# Patient Record
Sex: MALE | Race: WHITE | Employment: OTHER | URBAN - METROPOLITAN AREA
[De-identification: names, ages, dates, MRNs, and addresses within clinical notes are randomized per-mention and may not be internally consistent; named-entity substitution may affect disease eponyms.]

---

## 2018-07-10 ENCOUNTER — TRANSCRIBE ORDERS (OUTPATIENT)
Dept: ADMINISTRATIVE | Facility: HOSPITAL | Age: 73
End: 2018-07-10

## 2018-07-10 DIAGNOSIS — H91.90 HEARING PROBLEM, UNSPECIFIED LATERALITY: Primary | ICD-10-CM

## 2018-07-10 DIAGNOSIS — R26.89 BALANCE PROBLEM: ICD-10-CM

## 2018-07-10 DIAGNOSIS — R25.1 TREMOR: ICD-10-CM

## 2018-07-16 ENCOUNTER — HOSPITAL ENCOUNTER (OUTPATIENT)
Dept: MRI IMAGING | Facility: HOSPITAL | Age: 73
Discharge: HOME | End: 2018-07-16
Attending: PSYCHIATRY & NEUROLOGY
Payer: COMMERCIAL

## 2018-07-16 DIAGNOSIS — H91.90 HEARING PROBLEM, UNSPECIFIED LATERALITY: ICD-10-CM

## 2018-07-16 DIAGNOSIS — R26.89 BALANCE PROBLEM: ICD-10-CM

## 2018-07-16 DIAGNOSIS — R25.1 TREMOR: ICD-10-CM

## 2018-07-16 PROCEDURE — A9585 GADOBUTROL INJECTION: HCPCS | Performed by: PSYCHIATRY & NEUROLOGY

## 2018-07-16 PROCEDURE — 70553 MRI BRAIN STEM W/O & W/DYE: CPT

## 2018-07-16 RX ADMIN — GADOBUTROL 7.5 ML: 604.72 INJECTION INTRAVENOUS at 17:57

## 2018-11-30 ENCOUNTER — OFFICE VISIT (OUTPATIENT)
Dept: CARDIOLOGY CLINIC | Facility: CLINIC | Age: 73
End: 2018-11-30
Payer: COMMERCIAL

## 2018-11-30 VITALS
HEART RATE: 52 BPM | SYSTOLIC BLOOD PRESSURE: 130 MMHG | DIASTOLIC BLOOD PRESSURE: 80 MMHG | WEIGHT: 171 LBS | HEIGHT: 65 IN | BODY MASS INDEX: 28.49 KG/M2

## 2018-11-30 DIAGNOSIS — I10 BENIGN ESSENTIAL HTN: ICD-10-CM

## 2018-11-30 DIAGNOSIS — E78.2 MIXED HYPERLIPIDEMIA: ICD-10-CM

## 2018-11-30 DIAGNOSIS — I25.10 CORONARY ARTERY DISEASE INVOLVING NATIVE CORONARY ARTERY OF NATIVE HEART WITHOUT ANGINA PECTORIS: Primary | ICD-10-CM

## 2018-11-30 PROCEDURE — 99214 OFFICE O/P EST MOD 30 MIN: CPT | Performed by: INTERNAL MEDICINE

## 2018-11-30 PROCEDURE — 93000 ELECTROCARDIOGRAM COMPLETE: CPT | Performed by: INTERNAL MEDICINE

## 2018-11-30 RX ORDER — BENAZEPRIL HYDROCHLORIDE 20 MG/1
20 TABLET ORAL DAILY
COMMUNITY
Start: 2018-10-11

## 2018-11-30 RX ORDER — ASPIRIN 81 MG/1
TABLET ORAL DAILY
COMMUNITY

## 2018-11-30 RX ORDER — AMLODIPINE BESYLATE 5 MG/1
5 TABLET ORAL
COMMUNITY
Start: 2018-11-25

## 2018-11-30 RX ORDER — TAMSULOSIN HYDROCHLORIDE 0.4 MG/1
0.4 CAPSULE ORAL DAILY
COMMUNITY

## 2018-11-30 RX ORDER — ROSUVASTATIN CALCIUM 10 MG/1
10 TABLET, COATED ORAL DAILY
COMMUNITY
End: 2020-01-07 | Stop reason: SDUPTHER

## 2018-11-30 RX ORDER — ESCITALOPRAM OXALATE 20 MG/1
20 TABLET ORAL DAILY
COMMUNITY

## 2018-11-30 NOTE — PROGRESS NOTES
Patient ID: Marty Chilel is a 72 y o  male  Plan:      Coronary artery disease involving native coronary artery of native heart without angina pectoris  012 with stenting of the mid circumflex and distal circumflex  Borderline stenosis in the right coronary and LAD were left untreated  Lexiscan study in September of 2017 was normal     No current symptoms  Mixed hyperlipidemia  Tolerating statin therapy  Benign essential HTN  Adequately controlled  Follow up Plan:  1 year EKG and follow-up visit  HPI:  The patient is seen in follow-up today regarding CAD, hypertension, and hyperlipidemia  He is now 6 years post stenting of his circumflex system  There are borderline lesions in the right coronary and LAD  No recent chest pain or chest pressure  A stress test last September was normal       Results for orders placed or performed in visit on 11/30/18   POCT ECG    Impression    NSR  WNL  Past Surgical History:   Procedure Laterality Date    CORONARY ANGIOPLASTY WITH STENT PLACEMENT  09/13/2012    EF 65%, stent x2 to 95% mid CX and 80% distal CX   75% RCA and 60% LAD  CMP: No results found for: NA, K, CL, CO2, BUN, CREATININE, GLUCOSE, EGFR    Lipid Profile:   Lab Results   Component Value Date    CHOL 158 09/17/2014    TRIG 157 09/17/2014    HDL 33 09/17/2014         Review of Systems   10  point ROS  was otherwise non pertinent or negative except as per HPI or as below  Gait:  Normal         Objective:     /80   Pulse (!) 52   Ht 5' 5" (1 651 m)   Wt 77 6 kg (171 lb)   BMI 28 46 kg/m²     PHYSICAL EXAM:    General:  Normal appearance in no distress  Eyes:  Anicteric  Oral mucosa:  Moist   Neck:  No JVD  Carotid upstrokes are brisk without bruits  No masses  Chest:  Clear to auscultation and percussion  Cardiac:  Normal PMI  Normal S1 and S2  No murmur gallop or rub  Abdomen:  Soft and nontender   No palpable organomegaly or aortic enlargement  Extremities:  No peripheral edema  Musculoskeletal:  Symmetric  Vascular:  Femoral pulses are brisk without bruits  Popliteal pulses are intact bilaterally  Pedal pulses are intact  Neuro:  Grossly symmetric  Psych:  Alert and oriented x3  Current Outpatient Prescriptions:     amLODIPine (NORVASC) 5 mg tablet, Take 5 mg by mouth 2 (two) times a day, Disp: , Rfl:     aspirin (ECOTRIN LOW STRENGTH) 81 mg EC tablet, Daily, Disp: , Rfl:     benazepril (LOTENSIN) 20 mg tablet, Take 20 mg by mouth daily, Disp: , Rfl:     escitalopram (LEXAPRO) 20 mg tablet, Take 20 mg by mouth Daily, Disp: , Rfl:     rosuvastatin (CRESTOR) 10 MG tablet, Take 10 mg by mouth Daily  , Disp: , Rfl:     tamsulosin (FLOMAX) 0 4 mg, Take 0 4 mg by mouth daily  , Disp: , Rfl:   Allergies   Allergen Reactions    Sulfa Antibiotics      Past Medical History:   Diagnosis Date    Coronary artery disease     s/p stenting    History of nuclear stress test 10/15/2013    Lexiscan - EF 49%, no evidence for prior MI or ischemia   History of nuclear stress test 09/19/2017    Lexiscan - EF 51%, no evidence for prior MI or ischemia      Hyperlipidemia     Hypertension            History   Smoking Status    Never Smoker   Smokeless Tobacco    Never Used

## 2018-11-30 NOTE — ASSESSMENT & PLAN NOTE
012 with stenting of the mid circumflex and distal circumflex  Borderline stenosis in the right coronary and LAD were left untreated  Lexiscan study in September of 2017 was normal     No current symptoms

## 2020-01-07 ENCOUNTER — OFFICE VISIT (OUTPATIENT)
Dept: CARDIOLOGY CLINIC | Facility: CLINIC | Age: 75
End: 2020-01-07
Payer: COMMERCIAL

## 2020-01-07 VITALS
BODY MASS INDEX: 29.82 KG/M2 | HEIGHT: 65 IN | SYSTOLIC BLOOD PRESSURE: 124 MMHG | WEIGHT: 179 LBS | DIASTOLIC BLOOD PRESSURE: 78 MMHG | HEART RATE: 60 BPM

## 2020-01-07 DIAGNOSIS — E78.2 MIXED HYPERLIPIDEMIA: ICD-10-CM

## 2020-01-07 DIAGNOSIS — I25.10 CORONARY ARTERY DISEASE INVOLVING NATIVE CORONARY ARTERY OF NATIVE HEART WITHOUT ANGINA PECTORIS: Primary | ICD-10-CM

## 2020-01-07 DIAGNOSIS — I10 BENIGN ESSENTIAL HTN: ICD-10-CM

## 2020-01-07 PROCEDURE — 93000 ELECTROCARDIOGRAM COMPLETE: CPT | Performed by: INTERNAL MEDICINE

## 2020-01-07 PROCEDURE — 3074F SYST BP LT 130 MM HG: CPT | Performed by: INTERNAL MEDICINE

## 2020-01-07 PROCEDURE — 3078F DIAST BP <80 MM HG: CPT | Performed by: INTERNAL MEDICINE

## 2020-01-07 PROCEDURE — 99214 OFFICE O/P EST MOD 30 MIN: CPT | Performed by: INTERNAL MEDICINE

## 2020-01-07 RX ORDER — ROSUVASTATIN CALCIUM 20 MG/1
20 TABLET, COATED ORAL DAILY
Qty: 90 TABLET | Refills: 5 | Status: SHIPPED | OUTPATIENT
Start: 2020-01-07

## 2020-01-07 NOTE — PROGRESS NOTES
Patient ID: Elana Garcia is a 69 y o  male  Plan:      Mixed hyperlipidemia    Will increase the statin intensity by doubling the rosuvastatin dose  Coronary artery disease involving native coronary artery of native heart without angina pectoris   No current symptoms  Now is 7 years post stenting of the circumflex system  Benign essential HTN    Well controlled  Follow up Plan:  One year EKG and follow-up visit  At that point likely order a Pankaj Hanley study  HPI:   The patient is seen in follow-up today regarding CAD  To reiterate, in September of 2012 he underwent coronary angiography  Ejection fraction was normal   Severe stenosis of the mid and distal circumflex artery received stents  There was more borderline stenosis of the right coronary artery and LAD but follow-up stress testing was nonischemic  He has had no chest pain or chest pressure since then  Results for orders placed or performed in visit on 01/07/20   POCT ECG    Impression    NSR  WNL  Most recent or relevant cardiac/vascular testing:      [place] 09/19/2017: Normal ejection fraction  Normal       Past Surgical History:   Procedure Laterality Date    CORONARY ANGIOPLASTY WITH STENT PLACEMENT  09/13/2012    EF 65%, stent x2 to 95% mid CX and 80% distal CX   75% RCA and 60% LAD  CMP: No results found for: NA, K, CL, CO2, BUN, CREATININE, GLUCOSE, EGFR    Lipid Profile:   Lab Results   Component Value Date    CHOL 158 09/17/2014    TRIG 157 09/17/2014    HDL 33 09/17/2014         Review of Systems   10  point ROS  was otherwise non pertinent or negative except as per HPI or as below  Gait:   Slow but normal       Objective:     /78   Pulse 60   Ht 5' 5" (1 651 m)   Wt 81 2 kg (179 lb)   BMI 29 79 kg/m²     PHYSICAL EXAM:    General:  Normal appearance in no distress  Eyes:  Anicteric  Oral mucosa:  Moist   Neck:  No JVD  Carotid upstrokes are brisk without bruits    No masses  Chest:  Clear to auscultation and percussion  Cardiac:  Normal PMI  Normal S1 and S2  No murmur gallop or rub  Abdomen:  Soft and nontender  No palpable organomegaly or aortic enlargement  Extremities:  No peripheral edema  Musculoskeletal:  Symmetric  Vascular:  Femoral pulses are brisk without bruits  Popliteal pulses are intact bilaterally  Pedal pulses are intact  Neuro:  Grossly symmetric  Psych:  Alert and oriented x3  Current Outpatient Medications:     amLODIPine (NORVASC) 5 mg tablet, Take 5 mg by mouth 2 (two) times a day, Disp: , Rfl:     aspirin (ECOTRIN LOW STRENGTH) 81 mg EC tablet, Daily, Disp: , Rfl:     benazepril (LOTENSIN) 20 mg tablet, Take 20 mg by mouth daily, Disp: , Rfl:     escitalopram (LEXAPRO) 20 mg tablet, Take 20 mg by mouth Daily, Disp: , Rfl:     rosuvastatin (CRESTOR) 20 MG tablet, Take 1 tablet (20 mg total) by mouth daily, Disp: 90 tablet, Rfl: 5    tamsulosin (FLOMAX) 0 4 mg, Take 0 4 mg by mouth daily  , Disp: , Rfl:   Allergies   Allergen Reactions    Sulfa Antibiotics      Past Medical History:   Diagnosis Date    Coronary artery disease     s/p stenting    History of nuclear stress test 10/15/2013    Lexiscan - EF 49%, no evidence for prior MI or ischemia   History of nuclear stress test 09/19/2017    Lexiscan - EF 51%, no evidence for prior MI or ischemia      Hyperlipidemia     Hypertension            Social History     Tobacco Use   Smoking Status Never Smoker   Smokeless Tobacco Never Used

## 2021-03-10 ENCOUNTER — OFFICE VISIT (OUTPATIENT)
Dept: CARDIOLOGY CLINIC | Facility: CLINIC | Age: 76
End: 2021-03-10
Payer: COMMERCIAL

## 2021-03-10 VITALS
HEIGHT: 66 IN | DIASTOLIC BLOOD PRESSURE: 70 MMHG | HEART RATE: 61 BPM | BODY MASS INDEX: 27.21 KG/M2 | WEIGHT: 169.31 LBS | SYSTOLIC BLOOD PRESSURE: 130 MMHG

## 2021-03-10 DIAGNOSIS — E78.2 MIXED HYPERLIPIDEMIA: ICD-10-CM

## 2021-03-10 DIAGNOSIS — I10 BENIGN ESSENTIAL HTN: ICD-10-CM

## 2021-03-10 DIAGNOSIS — Z23 ENCOUNTER FOR IMMUNIZATION: ICD-10-CM

## 2021-03-10 DIAGNOSIS — I25.10 CORONARY ARTERY DISEASE INVOLVING NATIVE CORONARY ARTERY OF NATIVE HEART WITHOUT ANGINA PECTORIS: Primary | ICD-10-CM

## 2021-03-10 PROCEDURE — 93000 ELECTROCARDIOGRAM COMPLETE: CPT | Performed by: INTERNAL MEDICINE

## 2021-03-10 PROCEDURE — 99214 OFFICE O/P EST MOD 30 MIN: CPT | Performed by: INTERNAL MEDICINE

## 2021-03-10 NOTE — PROGRESS NOTES
Patient ID: Matt Castro is a 70 y o  male  Plan:      Coronary artery disease involving native coronary artery of native heart without angina pectoris  Stenting of the circumflex in 2012  No recent symptoms  Mixed hyperlipidemia  Tolerating statin therapy with excellent values in Care everywhere  Benign essential HTN  Well controlled  Follow up Plan/Other summary comments:  One year EKG and follow-up visit  HPI:  Patient is seen in follow-up today regarding the above  He has had no chest pain or chest pressure since his last visit  No recent change in exertional capacity  To reiterate, in September of 2012 he underwent coronary angiography  Ejection fraction was normal   Severe stenosis of the mid and distal circumflex artery received stents  There was more borderline stenosis of the right coronary artery and LAD but follow-up stress testing was nonischemic  Results for orders placed or performed in visit on 03/10/21   POCT ECG    Impression    Normal sinus rhythm  Normal          Most recent or relevant cardiac/vascular testing:     [facility] 09/19/2017: Normal ejection fraction  Normal         Past Surgical History:   Procedure Laterality Date    CORONARY ANGIOPLASTY WITH STENT PLACEMENT  09/13/2012    EF 65%, stent x2 to 95% mid CX and 80% distal CX   75% RCA and 60% LAD  CMP: No results found for: NA, K, CL, CO2, BUN, CREATININE, GLUCOSE, EGFR    Lipid Profile:   Lab Results   Component Value Date    CHOL 158 09/17/2014    TRIG 157 09/17/2014    HDL 33 09/17/2014         Review of Systems   10  point ROS  was otherwise non pertinent or negative except as per HPI or as below  Gait: Normal         Objective:     /70   Pulse 61   Ht 5' 6" (1 676 m)   Wt 76 8 kg (169 lb 5 oz)   BMI 27 33 kg/m²     PHYSICAL EXAM:    General:  Normal appearance in no distress  Eyes:  Anicteric  Oral mucosa:  Moist   Neck:  No JVD   Carotid upstrokes are brisk without bruits  No masses  Chest:  Clear to auscultation and percussion  Cardiac:  No palpable PMI  Normal S1 and S2  No murmur gallop or rub  Abdomen:  Soft and nontender  No palpable organomegaly or aortic enlargement  Extremities:  No peripheral edema  Musculoskeletal:  Symmetric  Vascular:  Femoral pulses are brisk without bruits  Popliteal pulses are intact bilaterally  Pedal pulses are intact  Neuro:  Grossly symmetric  Psych:  Alert and oriented x3  Current Outpatient Medications:     amLODIPine (NORVASC) 5 mg tablet, Take 5 mg by mouth 2 (two) times a day, Disp: , Rfl:     aspirin (ECOTRIN LOW STRENGTH) 81 mg EC tablet, Daily, Disp: , Rfl:     benazepril (LOTENSIN) 20 mg tablet, Take 20 mg by mouth daily, Disp: , Rfl:     escitalopram (LEXAPRO) 20 mg tablet, Take 20 mg by mouth Daily, Disp: , Rfl:     rosuvastatin (CRESTOR) 20 MG tablet, Take 1 tablet (20 mg total) by mouth daily, Disp: 90 tablet, Rfl: 5    tamsulosin (FLOMAX) 0 4 mg, Take 0 4 mg by mouth daily  , Disp: , Rfl:   Allergies   Allergen Reactions    Sulfa Antibiotics      Past Medical History:   Diagnosis Date    Coronary artery disease     s/p stenting    History of nuclear stress test 10/15/2013    Lexiscan - EF 49%, no evidence for prior MI or ischemia   History of nuclear stress test 09/19/2017    Lexiscan - EF 51%, no evidence for prior MI or ischemia      Hyperlipidemia     Hypertension            Social History     Tobacco Use   Smoking Status Never Smoker   Smokeless Tobacco Never Used

## 2021-03-30 ENCOUNTER — OFFICE VISIT (OUTPATIENT)
Dept: URGENT CARE | Facility: CLINIC | Age: 76
End: 2021-03-30
Payer: COMMERCIAL

## 2021-03-30 VITALS
SYSTOLIC BLOOD PRESSURE: 142 MMHG | WEIGHT: 166.6 LBS | BODY MASS INDEX: 27.76 KG/M2 | HEIGHT: 65 IN | RESPIRATION RATE: 16 BRPM | TEMPERATURE: 98.5 F | OXYGEN SATURATION: 98 % | DIASTOLIC BLOOD PRESSURE: 75 MMHG | HEART RATE: 83 BPM

## 2021-03-30 DIAGNOSIS — B02.9 HERPES ZOSTER WITHOUT COMPLICATION: Primary | ICD-10-CM

## 2021-03-30 PROCEDURE — S9083 URGENT CARE CENTER GLOBAL: HCPCS | Performed by: PHYSICIAN ASSISTANT

## 2021-03-30 PROCEDURE — 99213 OFFICE O/P EST LOW 20 MIN: CPT | Performed by: PHYSICIAN ASSISTANT

## 2021-03-30 RX ORDER — VALACYCLOVIR HYDROCHLORIDE 1 G/1
1000 TABLET, FILM COATED ORAL 3 TIMES DAILY
Qty: 30 TABLET | Refills: 0 | Status: SHIPPED | OUTPATIENT
Start: 2021-03-30 | End: 2021-04-09

## 2021-03-30 NOTE — PATIENT INSTRUCTIONS
Valtrex as directed  Fluid in blisters is contagious until blisters scab over  Contagious to anyone that has not had chicken pox or chicken pox vaccine  If you have increased pain, follow up with pcp

## 2021-03-30 NOTE — PROGRESS NOTES
[facility]    NAME: Cheri English is a 70 y o  male  : 1945    MRN: [id number]  DATE: 2021  TIME: 1:51 PM    Assessment and Plan   Herpes zoster without complication [W16 5]  1  Herpes zoster without complication  valACYclovir (VALTREX) 1,000 mg tablet       Patient Instructions     Patient Instructions   Valtrex as directed  Fluid in blisters is contagious until blisters scab over  Contagious to anyone that has not had chicken pox or chicken pox vaccine  If you have increased pain, follow up with pcp  Chief Complaint     Chief Complaint   Patient presents with    Rash     c/o rash on right lower back and right upper thigh, first noticed yesterday, worse today, denies pain or itching  Pt is [place]  History of Present Illness   70year old male here with complaint of blistery rash on his right buttock and down the lateral aspect of his right thigh to medial right knee  Started last night  Mildly painful  No itching  Thinks he has shingles  Review of Systems   Review of Systems   Constitutional: Negative for chills, fatigue and fever  HENT: Negative for congestion, ear pain, postnasal drip, sinus pressure and sore throat  Respiratory: Negative for cough, shortness of breath and wheezing  Skin: Positive for rash  Neurological: Negative for headaches  All other systems reviewed and are negative        Current Medications     Current Outpatient Medications:     amLODIPine (NORVASC) 5 mg tablet, Take 5 mg by mouth 2 (two) times a day, Disp: , Rfl:     aspirin (ECOTRIN LOW STRENGTH) 81 mg EC tablet, Daily, Disp: , Rfl:     benazepril (LOTENSIN) 20 mg tablet, Take 20 mg by mouth daily, Disp: , Rfl:     escitalopram (LEXAPRO) 20 mg tablet, Take 20 mg by mouth Daily, Disp: , Rfl:     rosuvastatin (CRESTOR) 20 MG tablet, Take 1 tablet (20 mg total) by mouth daily, Disp: 90 tablet, Rfl: 5    tamsulosin (FLOMAX) 0 4 mg, Take 0 4 mg by mouth daily  , Disp: , Rfl:   valACYclovir (VALTREX) 1,000 mg tablet, Take 1 tablet (1,000 mg total) by mouth 3 (three) times a day for 10 days, Disp: 30 tablet, Rfl: 0    Current Allergies     Allergies as of 03/30/2021 - Reviewed 03/30/2021   Allergen Reaction Noted    Sulfa antibiotics            The following portions of the patient's history were reviewed and updated as appropriate: allergies, current medications, past family history, past medical history, past social history, past surgical history and problem list    Past Medical History:   Diagnosis Date    Coronary artery disease     s/p stenting    History of nuclear stress test 10/15/2013    Lexiscan - EF 49%, no evidence for prior MI or ischemia   History of nuclear stress test 09/19/2017    Lexiscan - EF 51%, no evidence for prior MI or ischemia   Hyperlipidemia     Hypertension      Past Surgical History:   Procedure Laterality Date    CORONARY ANGIOPLASTY WITH STENT PLACEMENT  09/13/2012    EF 65%, stent x2 to 95% mid CX and 80% distal CX   75% RCA and 60% LAD       Family History   Problem Relation Age of Onset    Heart attack Father      Social History     Socioeconomic History    Marital status: /Civil Union     Spouse name: Not on file    Number of children: Not on file    Years of education: Not on file    Highest education level: Not on file   Occupational History    Not on file   Social Needs    Financial resource strain: Not on file    Food insecurity     Worry: Not on file     Inability: Not on file   [employer] needs     Medical: Not on file     Non-medical: Not on file   Tobacco Use    Smoking status: Never Smoker    Smokeless tobacco: Never Used   Substance and Sexual Activity    Alcohol use: No    Drug use: Not on file    Sexual activity: Not on file   Lifestyle    Physical activity     Days per week: Not on file     Minutes per session: Not on file    Stress: Not on file   Relationships    Social connections     Talks on phone: Not on file     Gets together: Not on file     Attends Jew service: Not on file     Active member of club or organization: Not on file     Attends meetings of clubs or organizations: Not on file     Relationship status: Not on file    Intimate partner violence     Fear of current or ex partner: Not on file     Emotionally abused: Not on file     Physically abused: Not on file     Forced sexual activity: Not on file   Other Topics Concern    Not on file   Social History Narrative    Not on file     Medications have been verified  Objective   /75 (BP Location: Left arm, Patient Position: Sitting, Cuff Size: Adult)   Pulse 83   Temp 98 5 °F (36 9 °C) (Temporal)   Resp 16   Ht 5' 5" (1 651 m)   Wt 75 6 kg (166 lb 9 6 oz)   SpO2 98%   BMI 27 72 kg/m²      Physical Exam   Physical Exam  Vitals signs reviewed  Constitutional:       General: He is not in acute distress  Appearance: He is well-developed  HENT:      Head: Normocephalic and atraumatic  Right Ear: Tympanic membrane normal       Left Ear: Tympanic membrane normal       Nose: No mucosal edema  Cardiovascular:      Rate and Rhythm: Normal rate and regular rhythm  Heart sounds: Normal heart sounds  Pulmonary:      Effort: Pulmonary effort is normal  No respiratory distress  Breath sounds: Normal breath sounds  Skin:     Findings: Rash (erythematous blistery rash right buttock, down right lateral thigh and medial knee) present

## 2021-05-24 ENCOUNTER — IMMUNIZATIONS (OUTPATIENT)
Dept: FAMILY MEDICINE CLINIC | Facility: HOSPITAL | Age: 76
End: 2021-05-24

## 2021-05-24 DIAGNOSIS — Z23 ENCOUNTER FOR IMMUNIZATION: Primary | ICD-10-CM

## 2021-05-24 PROCEDURE — 91300 SARS-COV-2 / COVID-19 MRNA VACCINE (PFIZER-BIONTECH) 30 MCG: CPT

## 2021-05-24 PROCEDURE — 0001A SARS-COV-2 / COVID-19 MRNA VACCINE (PFIZER-BIONTECH) 30 MCG: CPT

## 2021-06-03 ENCOUNTER — TELEPHONE (OUTPATIENT)
Dept: PALLIATIVE MEDICINE | Facility: CLINIC | Age: 76
End: 2021-06-03

## 2021-06-14 ENCOUNTER — APPOINTMENT (EMERGENCY)
Dept: CT IMAGING | Facility: HOSPITAL | Age: 76
End: 2021-06-14
Payer: COMMERCIAL

## 2021-06-14 ENCOUNTER — HOSPITAL ENCOUNTER (EMERGENCY)
Facility: HOSPITAL | Age: 76
Discharge: HOME | End: 2021-06-14
Attending: FAMILY MEDICINE | Admitting: FAMILY MEDICINE
Payer: COMMERCIAL

## 2021-06-14 VITALS
SYSTOLIC BLOOD PRESSURE: 153 MMHG | BODY MASS INDEX: 28.16 KG/M2 | TEMPERATURE: 97.2 F | DIASTOLIC BLOOD PRESSURE: 80 MMHG | OXYGEN SATURATION: 98 % | HEIGHT: 65 IN | WEIGHT: 169 LBS | RESPIRATION RATE: 18 BRPM | HEART RATE: 68 BPM

## 2021-06-14 DIAGNOSIS — R42 LIGHTHEADEDNESS: Primary | ICD-10-CM

## 2021-06-14 LAB
ANION GAP SERPL CALCULATED.3IONS-SCNC: 7 MMOL/L (ref 4–13)
ATRIAL RATE: 70 BPM
BASOPHILS # BLD AUTO: 0 THOUSANDS/ΜL (ref 0–0.1)
BASOPHILS NFR BLD AUTO: 0 % (ref 0–2)
BUN SERPL-MCNC: 11 MG/DL (ref 7–25)
CALCIUM SERPL-MCNC: 9.5 MG/DL (ref 8.6–10.5)
CHLORIDE SERPL-SCNC: 102 MMOL/L (ref 98–107)
CO2 SERPL-SCNC: 30 MMOL/L (ref 21–31)
CREAT SERPL-MCNC: 1.09 MG/DL (ref 0.7–1.3)
EOSINOPHIL # BLD AUTO: 0 THOUSAND/ΜL (ref 0–0.61)
EOSINOPHIL NFR BLD AUTO: 0 % (ref 0–5)
ERYTHROCYTE [DISTWIDTH] IN BLOOD BY AUTOMATED COUNT: 13.5 % (ref 11.5–14.5)
GFR SERPL CREATININE-BSD FRML MDRD: 67 ML/MIN/1.73SQ M
GLUCOSE SERPL-MCNC: 97 MG/DL (ref 65–140)
GLUCOSE SERPL-MCNC: 98 MG/DL (ref 65–99)
HCT VFR BLD AUTO: 37.8 % (ref 42–47)
HGB BLD-MCNC: 12.9 G/DL (ref 14–18)
LYMPHOCYTES # BLD AUTO: 1.1 THOUSANDS/ΜL (ref 0.6–4.47)
LYMPHOCYTES NFR BLD AUTO: 14 % (ref 21–51)
MCH RBC QN AUTO: 31.4 PG (ref 26–34)
MCHC RBC AUTO-ENTMCNC: 34.1 G/DL (ref 31–37)
MCV RBC AUTO: 92 FL (ref 81–99)
MONOCYTES # BLD AUTO: 0.9 THOUSAND/ΜL (ref 0.17–1.22)
MONOCYTES NFR BLD AUTO: 10 % (ref 2–12)
NEUTROPHILS # BLD AUTO: 6.4 THOUSANDS/ΜL (ref 1.4–6.5)
NEUTS SEG NFR BLD AUTO: 76 % (ref 42–75)
P AXIS: 53 DEGREES
PLATELET # BLD AUTO: 166 THOUSANDS/UL (ref 149–390)
PMV BLD AUTO: 8.1 FL (ref 8.6–11.7)
POTASSIUM SERPL-SCNC: 4.2 MMOL/L (ref 3.5–5.5)
PR INTERVAL: 142 MS
QRS AXIS: 8 DEGREES
QRSD INTERVAL: 84 MS
QT INTERVAL: 376 MS
QTC INTERVAL: 406 MS
RBC # BLD AUTO: 4.12 MILLION/UL (ref 4.3–5.9)
SODIUM SERPL-SCNC: 139 MMOL/L (ref 134–143)
T WAVE AXIS: 54 DEGREES
TROPONIN I SERPL-MCNC: <0.03 NG/ML
VENTRICULAR RATE: 70 BPM
WBC # BLD AUTO: 8.4 THOUSAND/UL (ref 4.8–10.8)

## 2021-06-14 PROCEDURE — 99284 EMERGENCY DEPT VISIT MOD MDM: CPT

## 2021-06-14 PROCEDURE — 93005 ELECTROCARDIOGRAM TRACING: CPT

## 2021-06-14 PROCEDURE — G1004 CDSM NDSC: HCPCS

## 2021-06-14 PROCEDURE — 84484 ASSAY OF TROPONIN QUANT: CPT | Performed by: FAMILY MEDICINE

## 2021-06-14 PROCEDURE — 85025 COMPLETE CBC W/AUTO DIFF WBC: CPT | Performed by: FAMILY MEDICINE

## 2021-06-14 PROCEDURE — 36415 COLL VENOUS BLD VENIPUNCTURE: CPT | Performed by: FAMILY MEDICINE

## 2021-06-14 PROCEDURE — 99284 EMERGENCY DEPT VISIT MOD MDM: CPT | Performed by: FAMILY MEDICINE

## 2021-06-14 PROCEDURE — 70450 CT HEAD/BRAIN W/O DYE: CPT

## 2021-06-14 PROCEDURE — 80048 BASIC METABOLIC PNL TOTAL CA: CPT | Performed by: FAMILY MEDICINE

## 2021-06-14 PROCEDURE — 82948 REAGENT STRIP/BLOOD GLUCOSE: CPT

## 2021-06-14 PROCEDURE — 93010 ELECTROCARDIOGRAM REPORT: CPT | Performed by: INTERNAL MEDICINE

## 2021-06-14 NOTE — ED PROVIDER NOTES
History  Chief Complaint   Patient presents with    Dizziness     Patient reports symptom onset after Shingles in April 2021  Patient states he fell to his knees this morning after dizzy spell  No head strike  HPI  This is a 57-year-old male with history of seasonal allergies presented to ED with complain of intermittent dizziness  Patient states that after getting shingle he has been having intermittent dizziness  His dizziness resolved prior to arrival   He denies any headache blurry vision double vision at this time  Patient denies any abdominal pain nausea vomiting or diarrhea  He denies any headache  Denies any dizziness at this time  Patient is awake alert oriented x3 GCS 15  NIH stroke scale is 0  Patient does states that he of was sick with the upper respiratory infection which now resolved  Prior to Admission Medications   Prescriptions Last Dose Informant Patient Reported? Taking? amLODIPine (NORVASC) 5 mg tablet 6/14/2021 at Unknown time  Yes Yes   Sig: Take 5 mg by mouth 2 (two) times a day   aspirin (ECOTRIN LOW STRENGTH) 81 mg EC tablet 6/14/2021 at Unknown time  Yes Yes   Sig: Daily   benazepril (LOTENSIN) 20 mg tablet 6/13/2021 at Unknown time  Yes Yes   Sig: Take 20 mg by mouth daily   escitalopram (LEXAPRO) 20 mg tablet 6/14/2021 at Unknown time  Yes Yes   Sig: Take 20 mg by mouth Daily   rosuvastatin (CRESTOR) 20 MG tablet 6/13/2021 at Unknown time  No Yes   Sig: Take 1 tablet (20 mg total) by mouth daily   tamsulosin (FLOMAX) 0 4 mg 6/13/2021 at Unknown time  Yes Yes   Sig: Take 0 4 mg by mouth daily     valACYclovir (VALTREX) 1,000 mg tablet   No No   Sig: Take 1 tablet (1,000 mg total) by mouth 3 (three) times a day for 10 days      Facility-Administered Medications: None       Past Medical History:   Diagnosis Date    Coronary artery disease     s/p stenting    History of nuclear stress test 10/15/2013    Lexiscan - EF 49%, no evidence for prior MI or ischemia      History of nuclear stress test 09/19/2017    Lexiscan - EF 51%, no evidence for prior MI or ischemia   Hyperlipidemia     Hypertension        Past Surgical History:   Procedure Laterality Date    CORONARY ANGIOPLASTY WITH STENT PLACEMENT  09/13/2012    EF 65%, stent x2 to 95% mid CX and 80% distal CX   75% RCA and 60% LAD   TONSILLECTOMY         Family History   Problem Relation Age of Onset    Heart attack Father      I have reviewed and agree with the history as documented  E-Cigarette/Vaping    E-Cigarette Use Never User      E-Cigarette/Vaping Substances    Nicotine No     THC No     CBD No     Flavoring No     Other No     Unknown No      Social History     Tobacco Use    Smoking status: Never Smoker    Smokeless tobacco: Never Used   Vaping Use    Vaping Use: Never used   Substance Use Topics    Alcohol use: No    Drug use: Never       Review of Systems   Constitutional: Negative  HENT: Negative  Eyes: Negative  Respiratory: Negative  Cardiovascular: Negative  Gastrointestinal: Negative  Endocrine: Negative  Genitourinary: Negative  Musculoskeletal: Negative  Skin: Negative  Allergic/Immunologic: Negative  Neurological: Positive for dizziness  Psychiatric/Behavioral: Negative  Physical Exam  Physical Exam  Vitals and nursing note reviewed  Constitutional:       General: He is not in acute distress  Appearance: He is well-developed  He is not diaphoretic  HENT:      Head: Normocephalic and atraumatic  Right Ear: External ear normal       Left Ear: External ear normal       Nose: Nose normal       Mouth/Throat:      Mouth: Mucous membranes are moist    Eyes:      Conjunctiva/sclera: Conjunctivae normal       Pupils: Pupils are equal, round, and reactive to light  Cardiovascular:      Rate and Rhythm: Normal rate and regular rhythm  Pulmonary:      Effort: Pulmonary effort is normal  No respiratory distress        Breath sounds: Normal breath sounds  No wheezing  Abdominal:      General: Bowel sounds are normal  There is no distension  Palpations: Abdomen is soft  Tenderness: There is no abdominal tenderness  Musculoskeletal:      Cervical back: Normal range of motion and neck supple  Lymphadenopathy:      Cervical: No cervical adenopathy  Skin:     General: Skin is warm and dry  Capillary Refill: Capillary refill takes less than 2 seconds  Neurological:      General: No focal deficit present  Mental Status: He is alert and oriented to person, place, and time     Psychiatric:         Mood and Affect: Mood normal          Behavior: Behavior normal          Vital Signs  ED Triage Vitals [06/14/21 1334]   Temperature Pulse Respirations Blood Pressure SpO2   (!) 97 2 °F (36 2 °C) 77 18 159/76 99 %      Temp Source Heart Rate Source Patient Position - Orthostatic VS BP Location FiO2 (%)   Tympanic Monitor Sitting Left arm --      Pain Score       --           Vitals:    06/14/21 1430 06/14/21 1500 06/14/21 1545 06/14/21 1600   BP: 154/72 151/72 161/74 153/80   Pulse: 65 63 70 68   Patient Position - Orthostatic VS:             Visual Acuity      ED Medications  Medications - No data to display    Diagnostic Studies  Results Reviewed     Procedure Component Value Units Date/Time    Troponin I [[id number]]  (Normal) Collected: 06/14/21 1410    Lab Status: Final result Specimen: Blood from Arm, Left Updated: 06/14/21 1436     Troponin I <0 03 ng/mL     Basic metabolic panel [[id number]] Collected: 06/14/21 1410    Lab Status: Final result Specimen: Blood from Arm, Left Updated: 06/14/21 1432     Sodium 139 mmol/L      Potassium 4 2 mmol/L      Chloride 102 mmol/L      CO2 30 mmol/L      ANION GAP 7 mmol/L      BUN 11 mg/dL      Creatinine 1 09 mg/dL      Glucose 98 mg/dL      Calcium 9 5 mg/dL      eGFR 67 ml/min/1 73sq m     Narrative:      [place] guidelines for Chronic Kidney Disease (CKD):     Stage 1 with normal or high GFR (GFR > 90 mL/min/1 73 square meters)    Stage 2 Mild CKD (GFR = 60-89 mL/min/1 73 square meters)    Stage 3A Moderate CKD (GFR = 45-59 mL/min/1 73 square meters)    Stage 3B Moderate CKD (GFR = 30-44 mL/min/1 73 square meters)    Stage 4 Severe CKD (GFR = 15-29 mL/min/1 73 square meters)    Stage 5 End Stage CKD (GFR <15 mL/min/1 73 square meters)  Note: GFR calculation is accurate only with a steady state creatinine    CBC and differential [[id number]]  (Abnormal) Collected: 06/14/21 1410    Lab Status: Final result Specimen: Blood from Arm, Left Updated: 06/14/21 1416     WBC 8 40 Thousand/uL      RBC 4 12 Million/uL      Hemoglobin 12 9 g/dL      Hematocrit 37 8 %      MCV 92 fL      MCH 31 4 pg      MCHC 34 1 g/dL      RDW 13 5 %      MPV 8 1 fL      Platelets 990 Thousands/uL      Neutrophils Relative 76 %      Lymphocytes Relative 14 %      Monocytes Relative 10 %      Eosinophils Relative 0 %      Basophils Relative 0 %      Neutrophils Absolute 6 40 Thousands/µL      Lymphocytes Absolute 1 10 Thousands/µL      Monocytes Absolute 0 90 Thousand/µL      Eosinophils Absolute 0 00 Thousand/µL      Basophils Absolute 0 00 Thousands/µL     Fingerstick Glucose (POCT) [[id number]]  (Normal) Collected: 06/14/21 1334    Lab Status: Final result Updated: 06/14/21 1336     POC Glucose 97 mg/dl                  CT head wo contrast   Final Result by Daphney Thacker MD (06/14 8018)      No acute intracranial abnormality  Workstation performed: [id number]                    Procedures  Procedures         ED Course                                           MDM  Number of Diagnoses or Management Options  Lightheadedness  Diagnosis management comments: Dizziness:  No dizziness at this time  Blood work CT head within normal limits  Could be vertigo secondary to dislodged in the ear bone  Patient is hard referral for PT OT for dizziness and Neurology recommending follow-up  Patient is asymptomatic at this time  And requesting to be discharged home  States I feel fine I would like to go home   Disposition  Final diagnoses:   Lightheadedness     Time reflects when diagnosis was documented in both MDM as applicable and the Disposition within this note     Time User Action Codes Description Comment    6/14/2021  4:25 PM Kei Luna Add [R42] [address] [facility]       ED Disposition     ED Disposition Condition Date/Time Comment    Discharge Stable Mon Jun 14, 2021  4:25 PM Marikay Gilford discharge to home/self care  Follow-up Information     Follow up With Specialties Details Why Contact Info    Dakota Kraft MD Internal Medicine Schedule an appointment as soon as possible for a visit in 2 days If symptoms worsen [address] [facility] [address]  [phone]            Discharge Medication List as of 6/14/2021  4:27 PM      CONTINUE these medications which have NOT CHANGED    Details   amLODIPine (NORVASC) 5 mg tablet Take 5 mg by mouth 2 (two) times a day, Starting Sun 11/25/2018, Historical Med      aspirin (ECOTRIN LOW STRENGTH) 81 mg EC tablet Daily, Historical Med      benazepril (LOTENSIN) 20 mg tablet Take 20 mg by mouth daily, Starting u 10/11/2018, Historical Med      escitalopram (LEXAPRO) 20 mg tablet Take 20 mg by mouth Daily, Historical Med      rosuvastatin (CRESTOR) 20 MG tablet Take 1 tablet (20 mg total) by mouth daily, Starting Tue 1/7/2020, Normal      tamsulosin (FLOMAX) 0 4 mg Take 0 4 mg by mouth daily  , Historical Med      valACYclovir (VALTREX) 1,000 mg tablet Take 1 tablet (1,000 mg total) by mouth 3 (three) times a day for 10 days, Starting Tue 3/30/2021, Until Fri 4/9/2021, Normal               PDMP Review     None          ED Provider  Electronically Signed by           Des Santiago MD  06/15/21 4513

## 2021-06-17 ENCOUNTER — IMMUNIZATIONS (OUTPATIENT)
Dept: FAMILY MEDICINE CLINIC | Facility: HOSPITAL | Age: 76
End: 2021-06-17

## 2021-06-17 DIAGNOSIS — Z23 ENCOUNTER FOR IMMUNIZATION: Primary | ICD-10-CM

## 2021-06-17 PROCEDURE — 91300 SARS-COV-2 / COVID-19 MRNA VACCINE (PFIZER-BIONTECH) 30 MCG: CPT

## 2021-06-17 PROCEDURE — 0002A SARS-COV-2 / COVID-19 MRNA VACCINE (PFIZER-BIONTECH) 30 MCG: CPT

## 2021-07-15 ENCOUNTER — OFFICE VISIT (OUTPATIENT)
Dept: CARDIOLOGY CLINIC | Facility: CLINIC | Age: 76
End: 2021-07-15
Payer: COMMERCIAL

## 2021-07-15 VITALS
DIASTOLIC BLOOD PRESSURE: 70 MMHG | BODY MASS INDEX: 26.16 KG/M2 | WEIGHT: 157 LBS | HEART RATE: 72 BPM | HEIGHT: 65 IN | SYSTOLIC BLOOD PRESSURE: 142 MMHG

## 2021-07-15 DIAGNOSIS — I25.10 CORONARY ARTERY DISEASE INVOLVING NATIVE CORONARY ARTERY OF NATIVE HEART WITHOUT ANGINA PECTORIS: Primary | ICD-10-CM

## 2021-07-15 DIAGNOSIS — I10 BENIGN ESSENTIAL HTN: ICD-10-CM

## 2021-07-15 DIAGNOSIS — E78.2 MIXED HYPERLIPIDEMIA: ICD-10-CM

## 2021-07-15 PROCEDURE — 3077F SYST BP >= 140 MM HG: CPT | Performed by: INTERNAL MEDICINE

## 2021-07-15 PROCEDURE — 3078F DIAST BP <80 MM HG: CPT | Performed by: INTERNAL MEDICINE

## 2021-07-15 PROCEDURE — 99214 OFFICE O/P EST MOD 30 MIN: CPT | Performed by: INTERNAL MEDICINE

## 2021-07-15 PROCEDURE — 1160F RVW MEDS BY RX/DR IN RCRD: CPT | Performed by: INTERNAL MEDICINE

## 2021-07-15 PROCEDURE — 1036F TOBACCO NON-USER: CPT | Performed by: INTERNAL MEDICINE

## 2021-07-15 PROCEDURE — 3008F BODY MASS INDEX DOCD: CPT | Performed by: INTERNAL MEDICINE

## 2021-07-15 NOTE — PROGRESS NOTES
Patient ID: Cortez Cassidy is a 78 y o  male  Plan:      Coronary artery disease involving native coronary artery of native heart without angina pectoris  I do not believe recent symptoms are coronary related but it has been many years since his prior stent and since the most recent stress test   He can not walk fast   A Cristina Antunez will be arranged  Mixed hyperlipidemia  He remains on statin therapy  Benign essential HTN  Adequately controlled  Follow up Plan/Other summary comments:  Cristina Antunez was ordered  If this is normal then follow-up visit and EKG in 1 year  HPI:  Patient is seen regarding the above  In March she had shingles  He really suffered for a month or so from this  Since then he has had lightheadedness when he starts exercising or physical effort after having been seated  He does not necessarily feel any orthostatic type symptoms if it is not associated with effort however  There has been no syncope or near syncope  No overt chest pain similar to what he experienced with his stent  To reiterate, in September of 2012 he underwent coronary angiography  Ejection fraction was normal   Severe stenosis of the mid and distal circumflex artery received stents  There was more borderline stenosis of the right coronary artery and LAD but follow-up stress testing was nonischemic  Most recent or relevant cardiac/vascular testing:     Chris Sebastien 09/19/2017: Normal ejection fraction  Normal         Past Surgical History:   Procedure Laterality Date    CORONARY ANGIOPLASTY WITH STENT PLACEMENT  09/13/2012    EF 65%, stent x2 to 95% mid CX and 80% distal CX   75% RCA and 60% LAD      TONSILLECTOMY       CMP:   Lab Results   Component Value Date    K 4 2 06/14/2021     06/14/2021    CO2 30 06/14/2021    BUN 11 06/14/2021    CREATININE 1 09 06/14/2021    EGFR 67 06/14/2021       Lipid Profile:   Lab Results   Component Value Date    CHOL 158 09/17/2014 TRIG 157 09/17/2014    HDL 33 09/17/2014         Review of Systems   10  point ROS  was otherwise non pertinent or negative except as per HPI or as below  Gait:  Slow  Objective:     /70   Pulse 72   Ht 5' 5" (1 651 m)   Wt 71 2 kg (157 lb)   BMI 26 13 kg/m²     PHYSICAL EXAM:    General:  Normal appearance in no distress  Eyes:  Anicteric  Oral mucosa:  Moist   Neck:  No JVD  Carotid upstrokes are brisk without bruits  No masses  Chest:  Clear to auscultation  Cardiac:  No palpable PMI  Normal S1 and S2  No murmur gallop or rub  Abdomen:  Soft and nontender  No palpable organomegaly or aortic enlargement  Extremities:  No peripheral edema  Musculoskeletal:  Symmetric  Vascular:  Femoral pulses are brisk without bruits  Popliteal pulses are intact bilaterally  Pedal pulses are intact  Neuro:  Grossly symmetric  Psych:  Alert and oriented x3  Current Outpatient Medications:     amLODIPine (NORVASC) 5 mg tablet, Take 5 mg by mouth 2 (two) times a day, Disp: , Rfl:     aspirin (ECOTRIN LOW STRENGTH) 81 mg EC tablet, Daily, Disp: , Rfl:     benazepril (LOTENSIN) 20 mg tablet, Take 20 mg by mouth daily, Disp: , Rfl:     escitalopram (LEXAPRO) 20 mg tablet, Take 20 mg by mouth Daily, Disp: , Rfl:     rosuvastatin (CRESTOR) 20 MG tablet, Take 1 tablet (20 mg total) by mouth daily, Disp: 90 tablet, Rfl: 5    tamsulosin (FLOMAX) 0 4 mg, Take 0 4 mg by mouth daily  , Disp: , Rfl:     valACYclovir (VALTREX) 1,000 mg tablet, Take 1 tablet (1,000 mg total) by mouth 3 (three) times a day for 10 days, Disp: 30 tablet, Rfl: 0  Allergies   Allergen Reactions    Sulfa Antibiotics      Past Medical History:   Diagnosis Date    Coronary artery disease     s/p stenting    History of nuclear stress test 10/15/2013    Lexiscan - EF 49%, no evidence for prior MI or ischemia   History of nuclear stress test 09/19/2017    Lexiscan - EF 51%, no evidence for prior MI or ischemia      Hyperlipidemia     Hypertension            Social History     Tobacco Use   Smoking Status Never Smoker   Smokeless Tobacco Never Used

## 2021-07-15 NOTE — ASSESSMENT & PLAN NOTE
I do not believe recent symptoms are coronary related but it has been many years since his prior stent and since the most recent stress test   He can not walk fast   A [employer] will be arranged

## 2021-08-05 ENCOUNTER — HOSPITAL ENCOUNTER (OUTPATIENT)
Dept: NUCLEAR MEDICINE | Facility: HOSPITAL | Age: 76
Discharge: HOME | End: 2021-08-05
Attending: INTERNAL MEDICINE
Payer: COMMERCIAL

## 2021-08-05 ENCOUNTER — HOSPITAL ENCOUNTER (OUTPATIENT)
Dept: NON INVASIVE DIAGNOSTICS | Facility: HOSPITAL | Age: 76
Discharge: HOME | End: 2021-08-05
Attending: INTERNAL MEDICINE
Payer: COMMERCIAL

## 2021-08-05 DIAGNOSIS — E78.2 MIXED HYPERLIPIDEMIA: ICD-10-CM

## 2021-08-05 DIAGNOSIS — I10 BENIGN ESSENTIAL HTN: ICD-10-CM

## 2021-08-05 DIAGNOSIS — I25.10 CORONARY ARTERY DISEASE INVOLVING NATIVE CORONARY ARTERY OF NATIVE HEART WITHOUT ANGINA PECTORIS: ICD-10-CM

## 2021-08-05 LAB
ARRHY DURING EX: NORMAL
CHEST PAIN STATEMENT: NORMAL
MAX DIASTOLIC BP: 84 MMHG
MAX HEART RATE: 130 BPM
MAX PREDICTED HEART RATE: 146 BPM
MAX. SYSTOLIC BP: 150 MMHG
PROTOCOL NAME: NORMAL
REASON FOR TERMINATION: NORMAL
TARGET HR FORMULA: NORMAL
TEST INDICATION: NORMAL
TIME IN EXERCISE PHASE: NORMAL

## 2021-08-05 PROCEDURE — 93018 CV STRESS TEST I&R ONLY: CPT | Performed by: INTERNAL MEDICINE

## 2021-08-05 PROCEDURE — 93016 CV STRESS TEST SUPVJ ONLY: CPT | Performed by: INTERNAL MEDICINE

## 2021-08-05 PROCEDURE — 93017 CV STRESS TEST TRACING ONLY: CPT

## 2021-08-05 PROCEDURE — 78452 HT MUSCLE IMAGE SPECT MULT: CPT | Performed by: INTERNAL MEDICINE

## 2021-08-05 PROCEDURE — A9502 TC99M TETROFOSMIN: HCPCS

## 2021-08-05 PROCEDURE — G1004 CDSM NDSC: HCPCS

## 2021-08-05 PROCEDURE — 78452 HT MUSCLE IMAGE SPECT MULT: CPT

## 2021-08-05 RX ADMIN — REGADENOSON 0.4 MG: 0.08 INJECTION, SOLUTION INTRAVENOUS at 09:20

## 2022-02-07 ENCOUNTER — APPOINTMENT (EMERGENCY)
Dept: NON INVASIVE DIAGNOSTICS | Facility: HOSPITAL | Age: 77
End: 2022-02-07
Payer: COMMERCIAL

## 2022-02-07 ENCOUNTER — HOSPITAL ENCOUNTER (EMERGENCY)
Facility: HOSPITAL | Age: 77
Discharge: HOME | End: 2022-02-07
Attending: EMERGENCY MEDICINE
Payer: COMMERCIAL

## 2022-02-07 ENCOUNTER — OFFICE VISIT (OUTPATIENT)
Dept: URGENT CARE | Facility: CLINIC | Age: 77
End: 2022-02-07
Payer: COMMERCIAL

## 2022-02-07 ENCOUNTER — APPOINTMENT (EMERGENCY)
Dept: CT IMAGING | Facility: HOSPITAL | Age: 77
End: 2022-02-07
Payer: COMMERCIAL

## 2022-02-07 VITALS
BODY MASS INDEX: 28.66 KG/M2 | OXYGEN SATURATION: 99 % | TEMPERATURE: 98.4 F | DIASTOLIC BLOOD PRESSURE: 77 MMHG | WEIGHT: 172 LBS | HEIGHT: 65 IN | RESPIRATION RATE: 20 BRPM | SYSTOLIC BLOOD PRESSURE: 154 MMHG | HEART RATE: 87 BPM

## 2022-02-07 VITALS
RESPIRATION RATE: 18 BRPM | OXYGEN SATURATION: 99 % | HEIGHT: 65 IN | HEART RATE: 93 BPM | BODY MASS INDEX: 28.66 KG/M2 | TEMPERATURE: 97.8 F | WEIGHT: 172 LBS

## 2022-02-07 DIAGNOSIS — M79.89 SWELLING OF CALF: Primary | ICD-10-CM

## 2022-02-07 DIAGNOSIS — M79.661 PAIN OF RIGHT CALF: ICD-10-CM

## 2022-02-07 DIAGNOSIS — M79.89 RIGHT LEG SWELLING: Primary | ICD-10-CM

## 2022-02-07 LAB
ANION GAP SERPL CALCULATED.3IONS-SCNC: 8 MMOL/L (ref 4–13)
ATRIAL RATE: 87 BPM
BASOPHILS # BLD AUTO: 0.02 THOUSANDS/ΜL (ref 0–0.1)
BASOPHILS NFR BLD AUTO: 0 % (ref 0–1)
BUN SERPL-MCNC: 16 MG/DL (ref 5–25)
CALCIUM SERPL-MCNC: 9.5 MG/DL (ref 8.4–10.2)
CHLORIDE SERPL-SCNC: 99 MMOL/L (ref 96–108)
CO2 SERPL-SCNC: 28 MMOL/L (ref 21–32)
CREAT SERPL-MCNC: 1.25 MG/DL (ref 0.6–1.3)
EOSINOPHIL # BLD AUTO: 0.02 THOUSAND/ΜL (ref 0–0.61)
EOSINOPHIL NFR BLD AUTO: 0 % (ref 0–6)
ERYTHROCYTE [DISTWIDTH] IN BLOOD BY AUTOMATED COUNT: 12.1 % (ref 11.6–15.1)
GFR SERPL CREATININE-BSD FRML MDRD: 56 ML/MIN/1.73SQ M
GLUCOSE SERPL-MCNC: 116 MG/DL (ref 65–140)
HCT VFR BLD AUTO: 38.9 % (ref 36.5–49.3)
HGB BLD-MCNC: 12.8 G/DL (ref 12–17)
IMM GRANULOCYTES # BLD AUTO: 0.04 THOUSAND/UL (ref 0–0.2)
IMM GRANULOCYTES NFR BLD AUTO: 0 % (ref 0–2)
LACTATE SERPL-SCNC: 0.7 MMOL/L (ref 0.5–2)
LYMPHOCYTES # BLD AUTO: 0.99 THOUSANDS/ΜL (ref 0.6–4.47)
LYMPHOCYTES NFR BLD AUTO: 7 % (ref 14–44)
MCH RBC QN AUTO: 30.3 PG (ref 26.8–34.3)
MCHC RBC AUTO-ENTMCNC: 32.9 G/DL (ref 31.4–37.4)
MCV RBC AUTO: 92 FL (ref 82–98)
MONOCYTES # BLD AUTO: 1.42 THOUSAND/ΜL (ref 0.17–1.22)
MONOCYTES NFR BLD AUTO: 11 % (ref 4–12)
NEUTROPHILS # BLD AUTO: 10.97 THOUSANDS/ΜL (ref 1.85–7.62)
NEUTS SEG NFR BLD AUTO: 82 % (ref 43–75)
NRBC BLD AUTO-RTO: 0 /100 WBCS
P AXIS: 53 DEGREES
PLATELET # BLD AUTO: 204 THOUSANDS/UL (ref 149–390)
PMV BLD AUTO: 9.6 FL (ref 8.9–12.7)
POTASSIUM SERPL-SCNC: 4.6 MMOL/L (ref 3.5–5.3)
PR INTERVAL: 144 MS
QRS AXIS: -9 DEGREES
QRSD INTERVAL: 80 MS
QT INTERVAL: 338 MS
QTC INTERVAL: 406 MS
RBC # BLD AUTO: 4.22 MILLION/UL (ref 3.88–5.62)
SODIUM SERPL-SCNC: 135 MMOL/L (ref 135–147)
T WAVE AXIS: 30 DEGREES
VENTRICULAR RATE: 87 BPM
WBC # BLD AUTO: 13.46 THOUSAND/UL (ref 4.31–10.16)

## 2022-02-07 PROCEDURE — 73701 CT LOWER EXTREMITY W/DYE: CPT

## 2022-02-07 PROCEDURE — S9083 URGENT CARE CENTER GLOBAL: HCPCS

## 2022-02-07 PROCEDURE — 83605 ASSAY OF LACTIC ACID: CPT | Performed by: EMERGENCY MEDICINE

## 2022-02-07 PROCEDURE — 93005 ELECTROCARDIOGRAM TRACING: CPT

## 2022-02-07 PROCEDURE — 85025 COMPLETE CBC W/AUTO DIFF WBC: CPT | Performed by: EMERGENCY MEDICINE

## 2022-02-07 PROCEDURE — 93010 ELECTROCARDIOGRAM REPORT: CPT | Performed by: INTERNAL MEDICINE

## 2022-02-07 PROCEDURE — G1004 CDSM NDSC: HCPCS

## 2022-02-07 PROCEDURE — 87040 BLOOD CULTURE FOR BACTERIA: CPT | Performed by: EMERGENCY MEDICINE

## 2022-02-07 PROCEDURE — 80048 BASIC METABOLIC PNL TOTAL CA: CPT | Performed by: EMERGENCY MEDICINE

## 2022-02-07 PROCEDURE — 93971 EXTREMITY STUDY: CPT

## 2022-02-07 PROCEDURE — 99284 EMERGENCY DEPT VISIT MOD MDM: CPT

## 2022-02-07 PROCEDURE — 99282 EMERGENCY DEPT VISIT SF MDM: CPT | Performed by: EMERGENCY MEDICINE

## 2022-02-07 PROCEDURE — 36415 COLL VENOUS BLD VENIPUNCTURE: CPT | Performed by: EMERGENCY MEDICINE

## 2022-02-07 PROCEDURE — 93971 EXTREMITY STUDY: CPT | Performed by: SURGERY

## 2022-02-07 PROCEDURE — 99213 OFFICE O/P EST LOW 20 MIN: CPT

## 2022-02-07 RX ADMIN — IOHEXOL 100 ML: 350 INJECTION, SOLUTION INTRAVENOUS at 16:20

## 2022-02-07 NOTE — PROGRESS NOTES
[address] [facility]        NAME: Aggie Cannon is a 78 y o  male  : 1945    MRN: [id number]  DATE: 2022  TIME: 4:56 PM      Assessment and Plan     Swelling of calf [M79 89]  1  Swelling of calf  Transfer to other facility   2  Pain of right calf  Transfer to other facility     Patient agreeable to go to the ER for further evaluation to r/o DVT  Patient offered ambulance, declined  Pt signed refusal for ambulance form, aware of risk of driving himself by POV to the ER such as PE, stroke, cardiac arrest, death d/t r/o DVT  Patient Instructions     Proceed to the ER for further evaluation  Chief Complaint     Chief Complaint   Patient presents with    Leg Pain     calf pain started over weekend worse swelling right leg          History of Present Illness     Patient is a 79-year-old male who presents with right calf pain and swelling for 3-4 days  States the pain is worse with standing and walking  Denies numbness or tingling  Denies recent long trips  Denies known injury  Denies insect bite to lower leg  Denies hx of DVT's or PE  Denies use of blood thinners, but does take 81 mg aspirin daily  Review of Systems     Review of Systems   Cardiovascular: Negative for chest pain  Musculoskeletal:        Right calf pain and swelling for 3-4 days   Skin: Negative for rash and wound  Neurological: Negative for dizziness, weakness and numbness  All other systems reviewed and are negative  Current Medications     No current facility-administered medications for this visit      Current Outpatient Medications:     amLODIPine (NORVASC) 5 mg tablet, Take 5 mg by mouth 2 (two) times a day, Disp: , Rfl:     aspirin (ECOTRIN LOW STRENGTH) 81 mg EC tablet, Daily, Disp: , Rfl:     benazepril (LOTENSIN) 20 mg tablet, Take 20 mg by mouth daily, Disp: , Rfl:     escitalopram (LEXAPRO) 20 mg tablet, Take 20 mg by mouth Daily, Disp: , Rfl:     rosuvastatin (CRESTOR) 20 MG tablet, Take 1 tablet (20 mg total) by mouth daily, Disp: 90 tablet, Rfl: 5    tamsulosin (FLOMAX) 0 4 mg, Take 0 4 mg by mouth daily  , Disp: , Rfl:     valACYclovir (VALTREX) 1,000 mg tablet, Take 1 tablet (1,000 mg total) by mouth 3 (three) times a day for 10 days, Disp: 30 tablet, Rfl: 0    Current Allergies     Allergies as of 02/07/2022 - Reviewed 02/07/2022   Allergen Reaction Noted    Sulfa antibiotics                The following portions of the patient's history were reviewed and updated as appropriate: allergies, current medications, past family history, past medical history, past social history, past surgical history and problem list      Past Medical History:   Diagnosis Date    Coronary artery disease     s/p stenting    History of nuclear stress test 10/15/2013    Lexiscan - EF 49%, no evidence for prior MI or ischemia   History of nuclear stress test 09/19/2017    Lexiscan - EF 51%, no evidence for prior MI or ischemia   Hyperlipidemia     Hypertension        Past Surgical History:   Procedure Laterality Date    CORONARY ANGIOPLASTY WITH STENT PLACEMENT  09/13/2012    EF 65%, stent x2 to 95% mid CX and 80% distal CX   75% RCA and 60% LAD   TONSILLECTOMY         Family History   Problem Relation Age of Onset    Heart attack Father          Medications have been verified  Objective     Pulse 93   Temp 97 8 °F (36 6 °C)   Resp 18   Ht 5' 5" (1 651 m)   Wt 78 kg (172 lb)   SpO2 99%   BMI 28 62 kg/m²   No LMP for male patient  Physical Exam     Physical Exam  Vitals and nursing note reviewed  Constitutional:       General: He is awake  He is not in acute distress  Appearance: Normal appearance  He is not ill-appearing, toxic-appearing or diaphoretic  Cardiovascular:      Rate and Rhythm: Normal rate  Pulses: Normal pulses  Popliteal pulses are 2+ on the right side  Dorsalis pedis pulses are 2+ on the right side          Posterior tibial pulses are 2+ on the right side  Heart sounds: Normal heart sounds, S1 normal and S2 normal  No murmur heard  Pulmonary:      Effort: Pulmonary effort is normal  No respiratory distress  Breath sounds: Normal breath sounds and air entry  No stridor, decreased air movement or transmitted upper airway sounds  No decreased breath sounds, wheezing, rhonchi or rales  Musculoskeletal:      Right upper leg: Normal  No swelling or tenderness  Left upper leg: Normal  No swelling or tenderness  Right knee: Normal  No swelling  No tenderness  Left knee: Normal       Right lower leg: Swelling and tenderness present  Left lower leg: No swelling or tenderness  Right ankle: No tenderness  Right foot: Normal capillary refill  No tenderness  Normal pulse  Legs:    Skin:     General: Skin is warm  Capillary Refill: Capillary refill takes less than 2 seconds  Neurological:      Mental Status: He is alert  Psychiatric:         Mood and Affect: Mood normal          Behavior: Behavior normal          Thought Content:  Thought content normal          Judgment: Judgment normal

## 2022-02-07 NOTE — ED NOTES
Patient call light on  Patient requesting to have IV removed and leave at this time  RN reported to patient that CT was not resulted yet but would make Dr Temi Engel aware   Provider at bedside     Elisa Beckman [facility]  02/07/22 3842

## 2022-02-07 NOTE — ED PROVIDER NOTES
History  Chief Complaint   Patient presents with    Leg Pain     pain in left lower calf  Began a week ago  has swelling     Patient is a 53-year-old male without pertinent medical history that presents for evaluation of right lower extremity swelling  Patient had spontaneous right lower extremity swelling from his right the to his right ankle  He has been progressively worsening over the past several days  He is on 81 mg aspirin but otherwise denies trauma to the area  There is no overlying skin changes  He says he has minimal pain when walking otherwise pain free  He denies history of abscess  Denies fevers, chills, rigors, nausea, vomiting  He has not been taking anything for the pain  He has still been ambulatory despite the pain  No PE or DVT risk factors  Sent in from urgent care for further workup and management  Prior to Admission Medications   Prescriptions Last Dose Informant Patient Reported? Taking? amLODIPine (NORVASC) 5 mg tablet   Yes No   Sig: Take 5 mg by mouth 2 (two) times a day   aspirin (ECOTRIN LOW STRENGTH) 81 mg EC tablet   Yes No   Sig: Daily   benazepril (LOTENSIN) 20 mg tablet   Yes No   Sig: Take 20 mg by mouth daily   escitalopram (LEXAPRO) 20 mg tablet   Yes No   Sig: Take 20 mg by mouth Daily   rosuvastatin (CRESTOR) 20 MG tablet   No No   Sig: Take 1 tablet (20 mg total) by mouth daily   tamsulosin (FLOMAX) 0 4 mg   Yes No   Sig: Take 0 4 mg by mouth daily     valACYclovir (VALTREX) 1,000 mg tablet   No No   Sig: Take 1 tablet (1,000 mg total) by mouth 3 (three) times a day for 10 days      Facility-Administered Medications: None       Past Medical History:   Diagnosis Date    Coronary artery disease     s/p stenting    History of nuclear stress test 10/15/2013    Lexiscan - EF 49%, no evidence for prior MI or ischemia   History of nuclear stress test 09/19/2017    Lexiscan - EF 51%, no evidence for prior MI or ischemia      Hyperlipidemia     Hypertension        Past Surgical History:   Procedure Laterality Date    CORONARY ANGIOPLASTY WITH STENT PLACEMENT  09/13/2012    EF 65%, stent x2 to 95% mid CX and 80% distal CX   75% RCA and 60% LAD   TONSILLECTOMY         Family History   Problem Relation Age of Onset    Heart attack Father      I have reviewed and agree with the history as documented  E-Cigarette/Vaping    E-Cigarette Use Never User      E-Cigarette/Vaping Substances    Nicotine No     THC No     CBD No     Flavoring No     Other No     Unknown No      Social History     Tobacco Use    Smoking status: Never Smoker    Smokeless tobacco: Never Used   Vaping Use    Vaping Use: Never used   Substance Use Topics    Alcohol use: No    Drug use: Never       Review of Systems   Constitutional: Negative for fever  HENT: Negative for sore throat  Eyes: Negative for photophobia  Respiratory: Negative for shortness of breath  Cardiovascular: Negative for chest pain  Gastrointestinal: Negative for abdominal pain  Genitourinary: Negative for dysuria  Musculoskeletal: Negative for back pain  Right leg swelling/pain   Skin: Negative for rash  Neurological: Negative for light-headedness  Hematological: Negative for adenopathy  Psychiatric/Behavioral: Negative for agitation  All other systems reviewed and are negative  Physical Exam  Physical Exam  Vitals reviewed  Constitutional:       General: He is not in acute distress  Appearance: He is well-developed  HENT:      Head: Normocephalic  Eyes:      Pupils: Pupils are equal, round, and reactive to light  Cardiovascular:      Rate and Rhythm: Normal rate and regular rhythm  Heart sounds: Normal heart sounds  No murmur heard  No friction rub  No gallop  Pulmonary:      Effort: Pulmonary effort is normal       Breath sounds: Normal breath sounds  Abdominal:      General: Bowel sounds are normal  There is no distension        Palpations: Abdomen is soft  Tenderness: There is no abdominal tenderness  There is no guarding  Musculoskeletal:         General: Normal range of motion  Cervical back: Normal range of motion and neck supple  Comments: Significant right calf swelling without much tenderness  Distally neurovascular intact with palpable PT and DP pulses  Skin:     Capillary Refill: Capillary refill takes less than 2 seconds  Neurological:      Mental Status: He is alert and oriented to person, place, and time  Cranial Nerves: No cranial nerve deficit  Sensory: No sensory deficit  Motor: No abnormal muscle tone  Psychiatric:         Behavior: Behavior normal          Thought Content: Thought content normal          Judgment: Judgment normal          Vital Signs  ED Triage Vitals [02/07/22 1420]   Temperature Pulse Respirations Blood Pressure SpO2   98 4 °F (36 9 °C) (!) 107 20 154/77 99 %      Temp Source Heart Rate Source Patient Position - Orthostatic VS BP Location FiO2 (%)   Oral Monitor Sitting Left arm --      Pain Score       3           Vitals:    02/07/22 1420 02/07/22 1448   BP: 154/77    Pulse: (!) 107 87   Patient Position - Orthostatic VS: Sitting          Visual Acuity      ED Medications  Medications   iohexol (OMNIPAQUE) 350 MG/ML injection (SINGLE-DOSE) 100 mL (100 mL Intravenous Given 2/7/22 1620)       Diagnostic Studies  Results Reviewed     Procedure Component Value Units Date/Time    Blood culture #1 [[id number]] Collected: 02/07/22 1616    Lab Status: Preliminary result Specimen: Blood from Hand, Left Updated: 02/09/22 2101     Blood Culture No Growth at 48 hrs  Blood culture [[id number]] Collected: 02/07/22 1613    Lab Status: Preliminary result Specimen: Blood from Arm, Right Updated: 02/09/22 2101     Blood Culture No Growth at 48 hrs      Lactic acid, plasma [[id number]]  (Normal) Collected: 02/07/22 1613    Lab Status: Final result Specimen: Blood from Arm, Right Updated: 02/07/22 1639     LACTIC ACID 0 7 mmol/L     Narrative:      Result may be elevated if tourniquet was used during collection      Basic metabolic panel [[id number]] Collected: 02/07/22 1541    Lab Status: Final result Specimen: Blood from Arm, Left Updated: 02/07/22 1605     Sodium 135 mmol/L      Potassium 4 6 mmol/L      Chloride 99 mmol/L      CO2 28 mmol/L      ANION GAP 8 mmol/L      BUN 16 mg/dL      Creatinine 1 25 mg/dL      Glucose 116 mg/dL      Calcium 9 5 mg/dL      eGFR 56 ml/min/1 73sq m     Narrative:      [place] guidelines for Chronic Kidney Disease (CKD):     Stage 1 with normal or high GFR (GFR > 90 mL/min/1 73 square meters)    Stage 2 Mild CKD (GFR = 60-89 mL/min/1 73 square meters)    Stage 3A Moderate CKD (GFR = 45-59 mL/min/1 73 square meters)    Stage 3B Moderate CKD (GFR = 30-44 mL/min/1 73 square meters)    Stage 4 Severe CKD (GFR = 15-29 mL/min/1 73 square meters)    Stage 5 End Stage CKD (GFR <15 mL/min/1 73 square meters)  Note: GFR calculation is accurate only with a steady state creatinine    CBC and differential [[id number]]  (Abnormal) Collected: 02/07/22 1541    Lab Status: Final result Specimen: Blood from Arm, Left Updated: 02/07/22 1551     WBC 13 46 Thousand/uL      RBC 4 22 Million/uL      Hemoglobin 12 8 g/dL      Hematocrit 38 9 %      MCV 92 fL      MCH 30 3 pg      MCHC 32 9 g/dL      RDW 12 1 %      MPV 9 6 fL      Platelets 600 Thousands/uL      nRBC 0 /100 WBCs      Neutrophils Relative 82 %      Immat GRANS % 0 %      Lymphocytes Relative 7 %      Monocytes Relative 11 %      Eosinophils Relative 0 %      Basophils Relative 0 %      Neutrophils Absolute 10 97 Thousands/µL      Immature Grans Absolute 0 04 Thousand/uL      Lymphocytes Absolute 0 99 Thousands/µL      Monocytes Absolute 1 42 Thousand/µL      Eosinophils Absolute 0 02 Thousand/µL      Basophils Absolute 0 02 Thousands/µL                  CT lower extremity w contrast right   Final Result by Toby Amaya MD (02/07 1659)   Probable partial tear of the medial gastrocnemius with prominent hematoma formation noted between the medial gastrocnemius and medial aspect of the soleus measuring up to 8 0 x 2 5 x 11 6 cm  In the absence of trauma history, spontaneous hemorrhage    could be a harbinger of underlying more aggressive pathology  Correlate clinically and follow-up to resolution  The study was marked in EPIC for significant notification  Workstation performed: [id number]         VAS lower limb venous duplex study, unilateral/limited   Final Result by Jennyfer Franks MD (02/07 2125)                 Procedures  Procedures         ED Course                               SBIRT 22yo+      Most Recent Value   SBIRT (25 yo +)    In order to provide better care to our patients, we are screening all of our patients for alcohol and drug use  Would it be okay to ask you these screening questions? Yes Filed at: 02/07/2022 1542   Initial Alcohol Screen: US AUDIT-C     1  How often do you have a drink containing alcohol? 0 Filed at: 02/07/2022 1542   2  How many drinks containing alcohol do you have on a typical day you are drinking? 0 Filed at: 02/07/2022 1542   3a  Male UNDER 65: How often do you have five or more drinks on one occasion? 0 Filed at: 02/07/2022 1542   3b  FEMALE Any Age, or MALE 65+: How often do you have 4 or more drinks on one occassion? 0 Filed at: 02/07/2022 1542   Audit-C Score 0 Filed at: 02/07/2022 1542   WINTER: How many times in the past year have you    Used an illegal drug or used a prescription medication for non-medical reasons? Never Filed at: 02/07/2022 1542                    MDM  Number of Diagnoses or Management Options  Right leg swelling  Diagnosis management comments: Patient is a 31-year-old male that presents for evaluation of right calf swelling and pain  Duplex study negative  CT imaging shows some type of mass on my read    He was unwilling to stay signed against medical advice  Patient had capacity and understood the risks including life-threatening injury permanent disability  Patient advised return tomorrow  Disposition  Final diagnoses:   Right leg swelling     Time reflects when diagnosis was documented in both MDM as applicable and the Disposition within this note     Time User Action Codes Description Comment    2/7/2022  4:53 PM Rosy Snellen Add [M79 89] Right leg swelling       ED Disposition     ED Disposition Condition Date/Time Comment    [facility] Feb 7, 2022  4:53 PM Date: 2/7/2022  Patient: Gautam Cannon  Admitted: 2/7/2022  2:22 PM  Attending Provider: Zeferino Liao MD    Gautam Cannon or his authorized caregiver has made the decision for the patient to leave the emergency department against the  advice of his attending physician  He or his authorized caregiver has been informed and understands the inherent risks, including death, permanent disability, loss of lower extremity emotional distress  He or his authorized caregiver has decided to  accept the responsibility for this decision  Gautam Cannon and all necessary parties have been advised that he may return for further evaluation or treatment  His condition at time of discharge was stable    Gautam Cannon had current vital s igns as follows:  /77 (BP Location: Left arm)   Pulse 87   Temp 98 4 °F (36 9 °C) (Oral)   Resp 20   Ht 5' 5" (1 651 m)   Wt 78 kg (172 lb)         Follow-up Information     Follow up With Specialties Details Why Contact Info Additional [address]  Emergency Department Emergency Medicine Go in 1 day  Kishan Pablo [phone]  [facility] Emergency Department, [address] [facility] AFFILIATED WITH [address]          Discharge Medication List as of 2/7/2022  4:54 PM      CONTINUE these medications which have NOT CHANGED    Details amLODIPine (NORVASC) 5 mg tablet Take 5 mg by mouth 2 (two) times a day, Starting Sun 11/25/2018, Historical Med      aspirin (ECOTRIN LOW STRENGTH) 81 mg EC tablet Daily, Historical Med      benazepril (LOTENSIN) 20 mg tablet Take 20 mg by mouth daily, Starting u 10/11/2018, Historical Med      escitalopram (LEXAPRO) 20 mg tablet Take 20 mg by mouth Daily, Historical Med      rosuvastatin (CRESTOR) 20 MG tablet Take 1 tablet (20 mg total) by mouth daily, Starting Tue 1/7/2020, Normal      tamsulosin (FLOMAX) 0 4 mg Take 0 4 mg by mouth daily  , Historical Med      valACYclovir (VALTREX) 1,000 mg tablet Take 1 tablet (1,000 mg total) by mouth 3 (three) times a day for 10 days, Starting Tue 3/30/2021, Until Fri 4/9/2021, Normal             No discharge procedures on file      PDMP Review     None          ED Provider  Electronically Signed by           Aliza Luz MD  02/10/22 0374

## 2022-02-08 ENCOUNTER — HOSPITAL ENCOUNTER (EMERGENCY)
Facility: HOSPITAL | Age: 77
Discharge: HOME | End: 2022-02-08
Attending: FAMILY MEDICINE
Payer: COMMERCIAL

## 2022-02-08 VITALS
BODY MASS INDEX: 28.62 KG/M2 | WEIGHT: 172 LBS | HEART RATE: 101 BPM | OXYGEN SATURATION: 97 % | DIASTOLIC BLOOD PRESSURE: 65 MMHG | TEMPERATURE: 98.9 F | RESPIRATION RATE: 18 BRPM | SYSTOLIC BLOOD PRESSURE: 142 MMHG

## 2022-02-08 DIAGNOSIS — M79.89 CALF SWELLING: Primary | ICD-10-CM

## 2022-02-08 PROCEDURE — 99283 EMERGENCY DEPT VISIT LOW MDM: CPT

## 2022-02-08 PROCEDURE — 99284 EMERGENCY DEPT VISIT MOD MDM: CPT | Performed by: PHYSICIAN ASSISTANT

## 2022-02-08 NOTE — ED PROVIDER NOTES
History  Chief Complaint   Patient presents with    Leg Swelling     59-year-old male presents to the emergency department seeking evaluation for leg swelling  Patient was in the emergency department yesterday for same however left against medical advice prior to results of imaging studies  Patient states there is no significant change in his right calf swelling  No reported trauma or injury the area  Symptoms have been present for several days  No external signs of trauma  The right calf is significantly swollen compared to the left  Compartments are soft  Allergies reviewed            Prior to Admission Medications   Prescriptions Last Dose Informant Patient Reported? Taking? amLODIPine (NORVASC) 5 mg tablet   Yes No   Sig: Take 5 mg by mouth 2 (two) times a day   aspirin (ECOTRIN LOW STRENGTH) 81 mg EC tablet   Yes No   Sig: Daily   benazepril (LOTENSIN) 20 mg tablet   Yes No   Sig: Take 20 mg by mouth daily   escitalopram (LEXAPRO) 20 mg tablet   Yes No   Sig: Take 20 mg by mouth Daily   rosuvastatin (CRESTOR) 20 MG tablet   No No   Sig: Take 1 tablet (20 mg total) by mouth daily   tamsulosin (FLOMAX) 0 4 mg   Yes No   Sig: Take 0 4 mg by mouth daily     valACYclovir (VALTREX) 1,000 mg tablet   No No   Sig: Take 1 tablet (1,000 mg total) by mouth 3 (three) times a day for 10 days      Facility-Administered Medications: None       Past Medical History:   Diagnosis Date    Coronary artery disease     s/p stenting    History of nuclear stress test 10/15/2013    Lexiscan - EF 49%, no evidence for prior MI or ischemia   History of nuclear stress test 09/19/2017    Lexiscan - EF 51%, no evidence for prior MI or ischemia   Hyperlipidemia     Hypertension        Past Surgical History:   Procedure Laterality Date    CORONARY ANGIOPLASTY WITH STENT PLACEMENT  09/13/2012    EF 65%, stent x2 to 95% mid CX and 80% distal CX   75% RCA and 60% LAD      TONSILLECTOMY         Family History   Problem Relation Age of Onset    Heart attack Father      I have reviewed and agree with the history as documented  E-Cigarette/Vaping    E-Cigarette Use Never User      E-Cigarette/Vaping Substances    Nicotine No     THC No     CBD No     Flavoring No     Other No     Unknown No      Social History     Tobacco Use    Smoking status: Never Smoker    Smokeless tobacco: Never Used   Vaping Use    Vaping Use: Never used   Substance Use Topics    Alcohol use: No    Drug use: Never       Review of Systems   Constitutional: Negative for chills and fever  HENT: Negative for ear pain and sore throat  Eyes: Negative for pain and visual disturbance  Respiratory: Negative for cough and shortness of breath  Cardiovascular: Negative for chest pain and palpitations  Gastrointestinal: Negative for abdominal pain and vomiting  Genitourinary: Negative for dysuria and hematuria  Musculoskeletal: Positive for myalgias  Negative for arthralgias and back pain  Skin: Negative for color change and rash  Neurological: Negative for seizures and syncope  All other systems reviewed and are negative  Physical Exam  Physical Exam  Vitals and nursing note reviewed  Constitutional:       General: He is not in acute distress  Appearance: He is well-developed  He is not ill-appearing  HENT:      Head: Normocephalic and atraumatic  Right Ear: External ear normal       Left Ear: External ear normal       Nose: Nose normal    Eyes:      Pupils: Pupils are equal, round, and reactive to light  Cardiovascular:      Rate and Rhythm: Normal rate and regular rhythm  Heart sounds: Normal heart sounds  No murmur heard  No friction rub  No gallop  Pulmonary:      Effort: Pulmonary effort is normal  No respiratory distress  Breath sounds: Normal breath sounds  No stridor  No wheezing or rales  Abdominal:      General: Bowel sounds are normal  There is no distension        Palpations: Abdomen is soft       Tenderness: There is no abdominal tenderness  There is no guarding  Musculoskeletal:         General: No tenderness  Normal range of motion  Cervical back: Normal range of motion and neck supple  Comments: Swollen right calf compared to left  Compartments are soft  Skin:     General: Skin is warm  Capillary Refill: Capillary refill takes less than 2 seconds  Neurological:      Mental Status: He is alert and oriented to person, place, and time  Psychiatric:         Behavior: Behavior is cooperative  Vital Signs  ED Triage Vitals [02/08/22 1326]   Temperature Pulse Respirations Blood Pressure SpO2   98 9 °F (37 2 °C) 101 18 142/65 97 %      Temp Source Heart Rate Source Patient Position - Orthostatic VS BP Location FiO2 (%)   Temporal Monitor Sitting Left arm --      Pain Score       No Pain           Vitals:    02/08/22 1326   BP: 142/65   Pulse: 101   Patient Position - Orthostatic VS: Sitting         Visual Acuity      ED Medications  Medications - No data to display    Diagnostic Studies  Results Reviewed     None                 No orders to display              Procedures  Procedures         ED Course  ED Course as of 02/08/22 1737   Tue Feb 08, 2022   1414 Patient made aware that leg swelling indicates either hematoma or mass concerning for tumor  Patient verbalized understanding of need for close follow-up  MDM  Number of Diagnoses or Management Options  Calf swelling  Diagnosis management comments: Imaging studies were reviewed the patient  Recommend close orthopedic follow-up  Possible muscle tear verses soft tissue mass  Case was discussed with on-call orthopedic Dr Tiffany Toth who suggested that muscle tear is more likely  Patient referred to Orthopedics contact information provided  Recommend ice and Tylenol for pain  Ace wrap was applied for comfort    Patient educated regarding their diagnosis and given return and follow-up instructions  Patient was advised to returned to the ED with worsening symptoms or concerns  Patient is understanding of and in agreement with the treatment plan  There are no questions at the time of discharge  Amount and/or Complexity of Data Reviewed  Tests in the radiology section of CPT®: reviewed    Risk of Complications, Morbidity, and/or Mortality  Presenting problems: minimal  Diagnostic procedures: minimal  Management options: minimal    Patient Progress  Patient progress: stable      Disposition  Final diagnoses:   Calf swelling     Time reflects when diagnosis was documented in both MDM as applicable and the Disposition within this note     Time User Action Codes Description Comment    2/8/2022  2:15 PM Godfrey Dubois [M79 89] Calf swelling     2/8/2022  2:15 PM Selena Lulu Add [R22 41] Leg mass, right     2/8/2022  2:15 PM Selena Lulu Remove [R22 41] Leg mass, right       ED Disposition     ED Disposition Condition Date/Time Comment    Discharge Stable Tue Feb 8, 2022  2:15 PM [address] discharge to home/self care              Follow-up Information     Follow up With Specialties Details Why Contact Info Additional [address] [facility] Darryl brower Orthopedic Surgery   [address] [place] [phone]  [address] [place] [address] Specialists Darryl brower, [address] [facility], [address] [place] [address]          Discharge Medication List as of 2/8/2022  2:29 PM      CONTINUE these medications which have NOT CHANGED    Details   amLODIPine (NORVASC) 5 mg tablet Take 5 mg by mouth 2 (two) times a day, Starting Sun 11/25/2018, Historical Med      aspirin (ECOTRIN LOW STRENGTH) 81 mg EC tablet Daily, Historical Med      benazepril (LOTENSIN) 20 mg tablet Take 20 mg by mouth daily, Starting Thu 10/11/2018, Historical Med      escitalopram (LEXAPRO) 20 mg tablet Take 20 mg by mouth Daily, Historical Med      rosuvastatin (CRESTOR) 20 MG tablet Take 1 tablet (20 mg total) by mouth daily, Starting Tue 1/7/2020, Normal      tamsulosin (FLOMAX) 0 4 mg Take 0 4 mg by mouth daily  , Historical Med      valACYclovir (VALTREX) 1,000 mg tablet Take 1 tablet (1,000 mg total) by mouth 3 (three) times a day for 10 days, Starting Tue 3/30/2021, Until Fri 4/9/2021, Normal                 PDMP Review     None          ED Provider  Electronically Signed by           Violette Montes PA-C  02/08/22 8619

## 2022-02-12 LAB
BACTERIA BLD CULT: NORMAL
BACTERIA BLD CULT: NORMAL

## 2022-02-15 ENCOUNTER — OFFICE VISIT (OUTPATIENT)
Dept: OBGYN CLINIC | Facility: CLINIC | Age: 77
End: 2022-02-15
Payer: COMMERCIAL

## 2022-02-15 VITALS
WEIGHT: 173 LBS | DIASTOLIC BLOOD PRESSURE: 73 MMHG | BODY MASS INDEX: 28.82 KG/M2 | HEART RATE: 84 BPM | SYSTOLIC BLOOD PRESSURE: 129 MMHG | HEIGHT: 65 IN

## 2022-02-15 DIAGNOSIS — M79.89 CALF SWELLING: ICD-10-CM

## 2022-02-15 DIAGNOSIS — S86.111A GASTROCNEMIUS MUSCLE TEAR, RIGHT, INITIAL ENCOUNTER: Primary | ICD-10-CM

## 2022-02-15 DIAGNOSIS — S80.11XA HEMATOMA OF RIGHT LOWER LEG: ICD-10-CM

## 2022-02-15 PROCEDURE — 99204 OFFICE O/P NEW MOD 45 MIN: CPT | Performed by: FAMILY MEDICINE

## 2022-02-15 NOTE — PATIENT INSTRUCTIONS
F/u 2 wks  Begin physical therapy  Icing/heat/OTC pain meds as needed  Range of motion exercises as tolerated  Walk as tolerated

## 2022-02-15 NOTE — PROGRESS NOTES
[address] [facility]  [place]  [address]  [address] [id number] [phone]      Chief Complaint:  Chief Complaint   Patient presents with    Right Lower Leg - Pain, Swelling       Vitals:  /73 (BP Location: Left arm, Patient Position: Sitting, Cuff Size: Standard)   Pulse 84   Ht 5' 5" (1 651 m)   Wt 78 5 kg (173 lb)   BMI 28 79 kg/m²     The following portions of the patient's history were reviewed and updated as appropriate: allergies, current medications, past family history, past medical history, past social history, past surgical history, and problem list       Subjective:   Patient ID: Roverto Jimnéez is a 78 y o  male  Here c/o R calf pain/swelling  Seen in ER- CT/US down- showed gastroc tear/hematoma  Note reviewed  He is still having pain  Hurts to walk  Pain for about 2 wks  Pain just started- cant remember any injury  Icing  Swelling is decreasing  CT right lower extremity with IV contrast     INDICATION: Complexes seen on ultrasound, evaluate      COMPARISON: None      TECHNIQUE: CT examination of the above was performed  This examination, like all CT scans performed in the [facility], was performed utilizing techniques to minimize radiation dose exposure, including the use of iterative reconstruction   and automated exposure control software  Sagittal and coronal two dimensional reconstructed images were also submitted for interpretation      IV Contrast: 100 mL of iohexol (OMNIPAQUE)     Rad dose  1406 28 mGy-cm      FINDINGS:     OSSEOUS STRUCTURES:  No fracture, dislocation or destructive osseous lesion    Age-appropriate degenerative change noted throughout the knee      VISUALIZED MUSCULATURE:  Unremarkable      SOFT TISSUES:  Unremarkable      OTHER PERTINENT FINDINGS:  Small Baker's cyst identified      Heterogeneous collection identified between the medial gastrocnemius and medial soleus with heterogeneous attenuation and regions of likely blood products  Findings are suspicious for medial gastrocnemius head tear with hematoma formation measuring up   to 8 0 x 2 5 x 11 6 cm      IMPRESSION:  Probable partial tear of the medial gastrocnemius with prominent hematoma formation noted between the medial gastrocnemius and medial aspect of the soleus measuring up to 8 0 x 2 5 x 11 6 cm  In the absence of trauma history, spontaneous hemorrhage   could be a harbinger of underlying more aggressive pathology  Correlate clinically and follow-up to resolution      The study was marked in EPIC for significant notification  Review of Systems   Constitutional: Negative for fatigue and fever  Respiratory: Negative for shortness of breath  Cardiovascular: Negative for chest pain  Gastrointestinal: Negative for abdominal pain and nausea  Genitourinary: Negative for dysuria  Musculoskeletal: Positive for gait problem and myalgias  Skin: Negative for rash and wound  Neurological: Negative for weakness and headaches  Objective:  Ortho Exam    Physical Exam  Vitals and nursing note reviewed  Constitutional:       Appearance: Normal appearance  He is well-developed  HENT:      Head: Normocephalic  Mouth/Throat:      Mouth: Mucous membranes are moist    Eyes:      Extraocular Movements: Extraocular movements intact  Cardiovascular:      Rate and Rhythm: Normal rate and regular rhythm  Heart sounds: Normal heart sounds  Pulmonary:      Effort: Pulmonary effort is normal       Breath sounds: Normal breath sounds  Abdominal:      General: Bowel sounds are normal       Palpations: Abdomen is soft  Musculoskeletal:         General: Swelling and tenderness present  Normal range of motion  Cervical back: Normal range of motion  Comments: R calf swelling/TTP medial gastroc     Skin:     General: Skin is warm and dry  Neurological:      General: No focal deficit present        Mental Status: He is alert and oriented to person, place, and time  Psychiatric:         Mood and Affect: Mood normal          Behavior: Behavior normal          Thought Content: Thought content normal          I have personally reviewed pertinent films in PACS and my interpretation is CT- R LE-  probable partial tear of medial gastroc with hematoma  Assessment/Plan:  Assessment/Plan   Diagnoses and all orders for this visit:    Gastrocnemius muscle tear, right, initial encounter  -     Ambulatory referral to Physical Therapy; Future    Calf swelling  -     Ambulatory Referral to Orthopedic Surgery  -     Ambulatory referral to Physical Therapy; Future    Hematoma of right lower leg  -     Ambulatory referral to Physical Therapy; Future        Return in about 2 weeks (around 3/1/2022) for Recheck       Adelso Gonzalez MD

## 2022-03-10 ENCOUNTER — OFFICE VISIT (OUTPATIENT)
Dept: OBGYN CLINIC | Facility: CLINIC | Age: 77
End: 2022-03-10
Payer: COMMERCIAL

## 2022-03-10 VITALS
HEIGHT: 65 IN | HEART RATE: 65 BPM | WEIGHT: 164 LBS | BODY MASS INDEX: 27.32 KG/M2 | TEMPERATURE: 98.1 F | DIASTOLIC BLOOD PRESSURE: 71 MMHG | SYSTOLIC BLOOD PRESSURE: 154 MMHG

## 2022-03-10 DIAGNOSIS — S86.111D STRAIN OF RIGHT GASTROCNEMIUS MUSCLE, SUBSEQUENT ENCOUNTER: Primary | ICD-10-CM

## 2022-03-10 PROCEDURE — 99213 OFFICE O/P EST LOW 20 MIN: CPT | Performed by: FAMILY MEDICINE

## 2022-03-10 NOTE — PROGRESS NOTES
[facility] [place] [address]  [facility] [id number] [phone]      Chief Complaint:  Chief Complaint   Patient presents with    Right Lower Leg - Follow-up       Vitals:  /71 (BP Location: Left arm, Patient Position: Sitting, Cuff Size: Standard)   Pulse 65   Temp 98 1 °F (36 7 °C) (Tympanic)   Ht 5' 5" (1 651 m)   Wt 74 4 kg (164 lb)   BMI 27 29 kg/m²     The following portions of the patient's history were reviewed and updated as appropriate: allergies, current medications, past family history, past medical history, past social history, past surgical history, and problem list       Subjective:   Patient ID: Bulmaro Yip is a 78 y o  male  Here for f/u  R calf pain/swelling/tear  Doing home exercises  Didn't start PT  No pain currently  No pain walking  No pain meds      Review of Systems   Constitutional: Negative for fatigue and fever  Respiratory: Negative for shortness of breath  Cardiovascular: Negative for chest pain  Gastrointestinal: Negative for abdominal pain and nausea  Genitourinary: Negative for dysuria  Musculoskeletal: Positive for myalgias  Skin: Negative for rash and wound  Neurological: Negative for weakness and headaches  Objective:  Ortho Exam    Physical Exam  Constitutional:       Appearance: Normal appearance  He is normal weight  Eyes:      Extraocular Movements: Extraocular movements intact  Pulmonary:      Effort: Pulmonary effort is normal    Musculoskeletal:         General: No tenderness  Cervical back: Normal range of motion  Comments: R calf no TTP  No swelling  Motor 5/5   Skin:     General: Skin is warm and dry  Neurological:      General: No focal deficit present  Mental Status: He is alert and oriented to person, place, and time  Mental status is at baseline     Psychiatric:         Mood and Affect: Mood normal          Behavior: Behavior normal  Thought Content: Thought content normal          Judgment: Judgment normal                Assessment/Plan:  Assessment/Plan   Diagnoses and all orders for this visit:    Strain of right gastrocnemius muscle, subsequent encounter        Return if symptoms worsen or fail to improve       Espinoza Turk MD

## 2022-09-01 ENCOUNTER — OFFICE VISIT (OUTPATIENT)
Dept: CARDIOLOGY CLINIC | Facility: CLINIC | Age: 77
End: 2022-09-01
Payer: COMMERCIAL

## 2022-09-01 VITALS
BODY MASS INDEX: 27.16 KG/M2 | SYSTOLIC BLOOD PRESSURE: 140 MMHG | DIASTOLIC BLOOD PRESSURE: 80 MMHG | HEART RATE: 60 BPM | HEIGHT: 65 IN | WEIGHT: 163 LBS

## 2022-09-01 DIAGNOSIS — R42 LIGHTHEADEDNESS: Primary | ICD-10-CM

## 2022-09-01 DIAGNOSIS — I10 BENIGN ESSENTIAL HTN: ICD-10-CM

## 2022-09-01 DIAGNOSIS — I25.10 CORONARY ARTERY DISEASE INVOLVING NATIVE CORONARY ARTERY OF NATIVE HEART WITHOUT ANGINA PECTORIS: ICD-10-CM

## 2022-09-01 DIAGNOSIS — E78.2 MIXED HYPERLIPIDEMIA: ICD-10-CM

## 2022-09-01 PROCEDURE — 99214 OFFICE O/P EST MOD 30 MIN: CPT | Performed by: INTERNAL MEDICINE

## 2022-09-01 NOTE — PROGRESS NOTES
Patient ID: Eliot Becerril is a 78 y o  male  Plan:      Coronary artery disease involving native coronary artery of native heart without angina pectoris  No clear-cut symptoms since stenting of the circumflex artery in 2012  Myoview study in August of 2021 was normal       Mixed hyperlipidemia  Tolerating statin therapy  Benign essential HTN  Adequately controlled  Lightheadedness  Often when he is working for a bit he feels a sense of lightheadedness  It sounds orthostatic  I asked him to check his pulse and blood pressure during such as spell  If blood pressure is on the low side I might be more permissive with his blood pressure and back off on current regimen  Follow up Plan/Other summary comments:  Return in about 1 year (around 9/1/2023)  HPI:  Patient is seen in follow-up today regarding the above  No recent chest pain or chest pressure  He remains physically reasonably active  Lightheadedness during work is his biggest complaint  To reiterate, in September of 2012 he underwent coronary angiography  Ejection fraction was normal   Severe stenosis of the mid and distal circumflex artery received stents  There was more borderline stenosis of the right coronary artery and LAD but follow-up stress testing was nonischemic  Most recent or relevant cardiac/vascular testing:       Myoview 8/5/2021: WNL  Past Surgical History:   Procedure Laterality Date    CORONARY ANGIOPLASTY WITH STENT PLACEMENT  09/13/2012    EF 65%, stent x2 to 95% mid CX and 80% distal CX   75% RCA and 60% LAD      TONSILLECTOMY       CMP:   Lab Results   Component Value Date    K 4 6 02/07/2022    CL 99 02/07/2022    CO2 28 02/07/2022    BUN 16 02/07/2022    CREATININE 1 25 02/07/2022    EGFR 56 02/07/2022       Lipid Profile:   Lab Results   Component Value Date    CHOL 158 09/17/2014    TRIG 157 09/17/2014    HDL 33 09/17/2014         Review of Systems   10  point ROS  was otherwise non pertinent or negative except as per HPI or as below  Gait: Normal          Objective:     /80   Pulse 60   Ht 5' 5" (1 651 m)   Wt 73 9 kg (163 lb)   BMI 27 12 kg/m²     PHYSICAL EXAM:    General:  Normal appearance in no distress  Eyes:  Anicteric  Oral mucosa:  Moist   Neck:  No JVD  Carotid upstrokes are brisk without bruits  No masses  Chest:  Clear to auscultation  Cardiac:  No palpable PMI  Normal S1 and S2  No murmur gallop or rub  Abdomen:  Soft and nontender  No palpable organomegaly or aortic enlargement  Extremities:  No peripheral edema  Musculoskeletal:  Symmetric  Vascular:  Femoral pulses are brisk without bruits  Popliteal pulses are intact bilaterally  Pedal pulses are intact  Neuro:  Grossly symmetric  Psych:  Alert and oriented x3  Current Outpatient Medications:     amLODIPine (NORVASC) 5 mg tablet, Take 5 mg by mouth 2 (two) times a day, Disp: , Rfl:     aspirin (ECOTRIN LOW STRENGTH) 81 mg EC tablet, Daily, Disp: , Rfl:     benazepril (LOTENSIN) 20 mg tablet, Take 20 mg by mouth daily, Disp: , Rfl:     escitalopram (LEXAPRO) 20 mg tablet, Take 20 mg by mouth Daily, Disp: , Rfl:     rosuvastatin (CRESTOR) 20 MG tablet, Take 1 tablet (20 mg total) by mouth daily, Disp: 90 tablet, Rfl: 5    tamsulosin (FLOMAX) 0 4 mg, Take 0 4 mg by mouth daily  , Disp: , Rfl:     valACYclovir (VALTREX) 1,000 mg tablet, Take 1 tablet (1,000 mg total) by mouth 3 (three) times a day for 10 days, Disp: 30 tablet, Rfl: 0  Allergies   Allergen Reactions    Sulfa Antibiotics      Past Medical History:   Diagnosis Date    Coronary artery disease     s/p stenting    History of nuclear stress test 10/15/2013    Lexiscan - EF 49%, no evidence for prior MI or ischemia   History of nuclear stress test 09/19/2017    Lexiscan - EF 51%, no evidence for prior MI or ischemia      Hyperlipidemia     Hypertension            Social History     Tobacco Use   Smoking Status Never Smoker   Smokeless Tobacco Never Used

## 2022-09-01 NOTE — ASSESSMENT & PLAN NOTE
Often when he is working for a bit he feels a sense of lightheadedness  It sounds orthostatic  I asked him to check his pulse and blood pressure during such as spell  If blood pressure is on the low side I might be more permissive with his blood pressure and back off on current regimen

## 2022-09-01 NOTE — ASSESSMENT & PLAN NOTE
No clear-cut symptoms since stenting of the circumflex artery in 2012    Myoview study in August of 2021 was normal

## 2022-09-01 NOTE — PATIENT INSTRUCTIONS
Check your blood pressure and pulse when you have that feeling of lightheadedness and call us with those results    My point is that if the blood pressure is low during a spell I might back off on blood pressure meds

## 2022-12-08 ENCOUNTER — OFFICE VISIT (OUTPATIENT)
Dept: URGENT CARE | Facility: CLINIC | Age: 77
End: 2022-12-08

## 2022-12-08 ENCOUNTER — APPOINTMENT (OUTPATIENT)
Dept: RADIOLOGY | Facility: CLINIC | Age: 77
End: 2022-12-08

## 2022-12-08 VITALS
WEIGHT: 163 LBS | OXYGEN SATURATION: 97 % | HEART RATE: 79 BPM | BODY MASS INDEX: 27.12 KG/M2 | RESPIRATION RATE: 18 BRPM | TEMPERATURE: 98.3 F

## 2022-12-08 DIAGNOSIS — R05.1 ACUTE COUGH: ICD-10-CM

## 2022-12-08 DIAGNOSIS — J20.9 ACUTE BRONCHITIS, UNSPECIFIED ORGANISM: Primary | ICD-10-CM

## 2022-12-08 RX ORDER — AZITHROMYCIN 250 MG/1
TABLET, FILM COATED ORAL
Qty: 6 TABLET | Refills: 0 | Status: SHIPPED | OUTPATIENT
Start: 2022-12-08 | End: 2022-12-12

## 2022-12-08 RX ORDER — PREDNISONE 10 MG/1
TABLET ORAL
Qty: 21 TABLET | Refills: 0 | Status: SHIPPED | OUTPATIENT
Start: 2022-12-08

## 2022-12-08 NOTE — PROGRESS NOTES
[address] [facility]        NAME: Ana Haider is a 78 y o  male  : 1945    MRN: [id number]  DATE: 2022  TIME: 2:12 PM    Assessment and Plan   Acute bronchitis, unspecified organism [J20 9]  1  Acute bronchitis, unspecified organism  azithromycin (ZITHROMAX) 250 mg tablet    predniSONE 10 mg tablet      2  Acute cough  XR chest pa & lateral    predniSONE 10 mg tablet            Patient Instructions     Take azithromycin as prescribed  Start prednisone taper as prescribed  Take 6 pills day one, 5 pills day 2, 4 pills day 3, 3 pills day 4, 2 pills day 5, and 1 pill day 6  Vitamin D3 2000 IU daily  Vitamin C 1000mg twice per day  Multivitamin daily  Fluids and rest  Over the counter cold medication as needed (EX: Coricidin HBP, tylenol/motrin)  Follow up with PCP in 3-5 days  Proceed to ER if symptoms worsen  Eat yogurt with live and active cultures and/or take a probiotic at least 3 hours before or after antibiotic dose  Monitor stool for diarrhea and/or blood  If this occurs, contact primary care doctor ASAP  Chief Complaint     Chief Complaint   Patient presents with   • Cold Like Symptoms     X 4 weeks   • Cough         History of Present Illness       Patient is a 14-year-old male with significant PMH of CAD presenting in the clinic today for cold symptoms x 5 weeks  Admits productive cough with discolored sputum, chest pressure, sinus pain/pressure  Denies fever, chills, congestion, sore throat, SOB, abdominal pain, n/v/d, body aches, headache  Patient states his symptoms are worse at night and when lying down  Admits to use of cough drops, Maryanne-Delmar, NyQuil, and nasal sinus pressure with mild symptom relief  Denies recent sick contacts  Patient is covid and flu vaccinated  Denies smoking history  Review of Systems   Review of Systems   Constitutional: Negative for chills, fatigue and fever  HENT: Positive for sinus pressure and sinus pain   Negative for congestion, ear pain, postnasal drip, rhinorrhea and sore throat  Respiratory: Positive for cough and chest tightness  Negative for shortness of breath  Cardiovascular: Negative for chest pain  Gastrointestinal: Negative for abdominal pain, diarrhea, nausea and vomiting  Musculoskeletal: Negative for myalgias  Skin: Negative for rash  Neurological: Negative for headaches  Current Medications       Current Outpatient Medications:   •  amLODIPine (NORVASC) 5 mg tablet, Take 5 mg by mouth 2 (two) times a day, Disp: , Rfl:   •  aspirin (ECOTRIN LOW STRENGTH) 81 mg EC tablet, Daily, Disp: , Rfl:   •  azithromycin (ZITHROMAX) 250 mg tablet, Take 2 tablets today then 1 tablet daily x 4 days, Disp: 6 tablet, Rfl: 0  •  benazepril (LOTENSIN) 20 mg tablet, Take 20 mg by mouth daily, Disp: , Rfl:   •  escitalopram (LEXAPRO) 20 mg tablet, Take 20 mg by mouth Daily, Disp: , Rfl:   •  predniSONE 10 mg tablet, Take 6 pills day one, 5 pills day 2, 4 pills day 3, 3 pills day 4, 2 pills day 5, and 1 pill day 6, Disp: 21 tablet, Rfl: 0  •  rosuvastatin (CRESTOR) 20 MG tablet, Take 1 tablet (20 mg total) by mouth daily, Disp: 90 tablet, Rfl: 5  •  tamsulosin (FLOMAX) 0 4 mg, Take 0 4 mg by mouth daily  , Disp: , Rfl:   •  valACYclovir (VALTREX) 1,000 mg tablet, Take 1 tablet (1,000 mg total) by mouth 3 (three) times a day for 10 days, Disp: 30 tablet, Rfl: 0    Current Allergies     Allergies as of 12/08/2022 - Reviewed 12/08/2022   Allergen Reaction Noted   • Sulfa antibiotics              The following portions of the patient's history were reviewed and updated as appropriate: allergies, current medications, past family history, past medical history, past social history, past surgical history and problem list      Past Medical History:   Diagnosis Date   • Coronary artery disease     s/p stenting   • History of nuclear stress test 10/15/2013    Lexiscan - EF 49%, no evidence for prior MI or ischemia     • History of nuclear stress test 09/19/2017    Lexiscan - EF 51%, no evidence for prior MI or ischemia  • Hyperlipidemia    • Hypertension        Past Surgical History:   Procedure Laterality Date   • CORONARY ANGIOPLASTY WITH STENT PLACEMENT  09/13/2012    EF 65%, stent x2 to 95% mid CX and 80% distal CX   75% RCA and 60% LAD  • TONSILLECTOMY         Family History   Problem Relation Age of Onset   • Heart attack Father          Medications have been verified  Objective   Pulse 79   Temp 98 3 °F (36 8 °C)   Resp 18   Wt 73 9 kg (163 lb)   SpO2 97%   BMI 27 12 kg/m²        Physical Exam     Physical Exam  Vitals reviewed  Constitutional:       General: He is not in acute distress  Appearance: Normal appearance  He is normal weight  He is not ill-appearing  HENT:      Head: Normocephalic and atraumatic  Right Ear: Tympanic membrane, ear canal and external ear normal  There is no impacted cerumen  Tympanic membrane is not erythematous or bulging  Left Ear: Tympanic membrane, ear canal and external ear normal  There is no impacted cerumen  Tympanic membrane is not erythematous or bulging  Nose: Nose normal  No congestion or rhinorrhea  Right Sinus: No maxillary sinus tenderness or frontal sinus tenderness  Left Sinus: No maxillary sinus tenderness or frontal sinus tenderness  Mouth/Throat:      Mouth: Mucous membranes are moist       Pharynx: Oropharynx is clear  Uvula midline  No pharyngeal swelling, oropharyngeal exudate or posterior oropharyngeal erythema  Tonsils: 0 on the right  0 on the left  Comments: History of tonsillectomy  Eyes:      General:         Right eye: No discharge  Left eye: No discharge  Conjunctiva/sclera: Conjunctivae normal    Cardiovascular:      Rate and Rhythm: Normal rate and regular rhythm  Pulses: Normal pulses  Heart sounds: Normal heart sounds  No murmur heard  No friction rub  No gallop     Pulmonary:      Effort: Pulmonary effort is normal  No respiratory distress  Breath sounds: No stridor  Examination of the right-upper field reveals decreased breath sounds  Examination of the left-upper field reveals decreased breath sounds  Examination of the right-lower field reveals decreased breath sounds  Examination of the left-lower field reveals decreased breath sounds  Decreased breath sounds and wheezing present  No rhonchi or rales  Abdominal:      General: Abdomen is flat  Bowel sounds are normal  There is no distension  Palpations: Abdomen is soft  Tenderness: There is no abdominal tenderness  There is no guarding  Musculoskeletal:      Cervical back: Normal range of motion and neck supple  No tenderness  Lymphadenopathy:      Cervical: No cervical adenopathy  Skin:     General: Skin is warm  Capillary Refill: Capillary refill takes less than 2 seconds  Neurological:      Mental Status: He is alert     Psychiatric:         Mood and Affect: Mood normal          Behavior: Behavior normal

## 2022-12-08 NOTE — PATIENT INSTRUCTIONS
Take azithromycin as prescribed  Start prednisone taper as prescribed  Take 6 pills day one, 5 pills day 2, 4 pills day 3, 3 pills day 4, 2 pills day 5, and 1 pill day 6  Vitamin D3 2000 IU daily  Vitamin C 1000mg twice per day  Multivitamin daily  Fluids and rest  Over the counter cold medication as needed (EX: Coricidin HBP, tylenol/motrin)  Follow up with PCP in 3-5 days  Proceed to ER if symptoms worsen  Eat yogurt with live and active cultures and/or take a probiotic at least 3 hours before or after antibiotic dose  Monitor stool for diarrhea and/or blood  If this occurs, contact primary care doctor ASAP

## 2023-11-06 PROCEDURE — 88305 TISSUE EXAM BY PATHOLOGIST: CPT | Performed by: PATHOLOGY

## 2023-11-10 PROCEDURE — 88305 TISSUE EXAM BY PATHOLOGIST: CPT | Performed by: PATHOLOGY

## 2023-12-15 ENCOUNTER — HOSPITAL ENCOUNTER (EMERGENCY)
Facility: HOSPITAL | Age: 78
Discharge: HOME | End: 2023-12-15
Attending: EMERGENCY MEDICINE
Payer: COMMERCIAL

## 2023-12-15 VITALS
DIASTOLIC BLOOD PRESSURE: 73 MMHG | RESPIRATION RATE: 17 BRPM | HEART RATE: 64 BPM | OXYGEN SATURATION: 96 % | SYSTOLIC BLOOD PRESSURE: 165 MMHG | TEMPERATURE: 97.6 F

## 2023-12-15 DIAGNOSIS — R42 DIZZINESS: ICD-10-CM

## 2023-12-15 DIAGNOSIS — R26.2 AMBULATORY DYSFUNCTION: ICD-10-CM

## 2023-12-15 DIAGNOSIS — E86.0 DEHYDRATION: Primary | ICD-10-CM

## 2023-12-15 LAB
ANION GAP SERPL CALCULATED.3IONS-SCNC: 12 MMOL/L
BASOPHILS # BLD AUTO: 0.02 THOUSANDS/ÂΜL (ref 0–0.1)
BASOPHILS NFR BLD AUTO: 0 % (ref 0–1)
BILIRUB UR QL STRIP: NEGATIVE
BUN SERPL-MCNC: 13 MG/DL (ref 5–25)
CALCIUM SERPL-MCNC: 9.6 MG/DL (ref 8.4–10.2)
CHLORIDE SERPL-SCNC: 101 MMOL/L (ref 96–108)
CLARITY UR: CLEAR
CO2 SERPL-SCNC: 25 MMOL/L (ref 21–32)
COLOR UR: YELLOW
CREAT SERPL-MCNC: 1.04 MG/DL (ref 0.6–1.3)
EOSINOPHIL # BLD AUTO: 0.01 THOUSAND/ÂΜL (ref 0–0.61)
EOSINOPHIL NFR BLD AUTO: 0 % (ref 0–6)
ERYTHROCYTE [DISTWIDTH] IN BLOOD BY AUTOMATED COUNT: 11.8 % (ref 11.6–15.1)
GFR SERPL CREATININE-BSD FRML MDRD: 69 ML/MIN/1.73SQ M
GLUCOSE SERPL-MCNC: 117 MG/DL (ref 65–140)
GLUCOSE UR STRIP-MCNC: NEGATIVE MG/DL
HCT VFR BLD AUTO: 41.9 % (ref 36.5–49.3)
HGB BLD-MCNC: 14.3 G/DL (ref 12–17)
HGB UR QL STRIP.AUTO: NEGATIVE
IMM GRANULOCYTES # BLD AUTO: 0.02 THOUSAND/UL (ref 0–0.2)
IMM GRANULOCYTES NFR BLD AUTO: 0 % (ref 0–2)
KETONES UR STRIP-MCNC: ABNORMAL MG/DL
LEUKOCYTE ESTERASE UR QL STRIP: NEGATIVE
LYMPHOCYTES # BLD AUTO: 1.11 THOUSANDS/ÂΜL (ref 0.6–4.47)
LYMPHOCYTES NFR BLD AUTO: 16 % (ref 14–44)
MAGNESIUM SERPL-MCNC: 2.2 MG/DL (ref 1.9–2.7)
MCH RBC QN AUTO: 31.5 PG (ref 26.8–34.3)
MCHC RBC AUTO-ENTMCNC: 34.1 G/DL (ref 31.4–37.4)
MCV RBC AUTO: 92 FL (ref 82–98)
MONOCYTES # BLD AUTO: 0.69 THOUSAND/ÂΜL (ref 0.17–1.22)
MONOCYTES NFR BLD AUTO: 10 % (ref 4–12)
NEUTROPHILS # BLD AUTO: 4.95 THOUSANDS/ÂΜL (ref 1.85–7.62)
NEUTS SEG NFR BLD AUTO: 74 % (ref 43–75)
NITRITE UR QL STRIP: NEGATIVE
NRBC BLD AUTO-RTO: 0 /100 WBCS
PH UR STRIP.AUTO: 6.5 [PH]
PLATELET # BLD AUTO: 151 THOUSANDS/UL (ref 149–390)
PMV BLD AUTO: 9.7 FL (ref 8.9–12.7)
POTASSIUM SERPL-SCNC: 3.9 MMOL/L (ref 3.5–5.3)
PROT UR STRIP-MCNC: NEGATIVE MG/DL
RBC # BLD AUTO: 4.54 MILLION/UL (ref 3.88–5.62)
SODIUM SERPL-SCNC: 138 MMOL/L (ref 135–147)
SP GR UR STRIP.AUTO: <=1.005
UROBILINOGEN UR QL STRIP.AUTO: 0.2 E.U./DL
WBC # BLD AUTO: 6.8 THOUSAND/UL (ref 4.31–10.16)

## 2023-12-15 PROCEDURE — 99284 EMERGENCY DEPT VISIT MOD MDM: CPT | Performed by: EMERGENCY MEDICINE

## 2023-12-15 PROCEDURE — 93005 ELECTROCARDIOGRAM TRACING: CPT

## 2023-12-15 PROCEDURE — 99285 EMERGENCY DEPT VISIT HI MDM: CPT

## 2023-12-15 PROCEDURE — 83735 ASSAY OF MAGNESIUM: CPT | Performed by: EMERGENCY MEDICINE

## 2023-12-15 PROCEDURE — 80048 BASIC METABOLIC PNL TOTAL CA: CPT | Performed by: EMERGENCY MEDICINE

## 2023-12-15 PROCEDURE — 96361 HYDRATE IV INFUSION ADD-ON: CPT

## 2023-12-15 PROCEDURE — 81003 URINALYSIS AUTO W/O SCOPE: CPT | Performed by: EMERGENCY MEDICINE

## 2023-12-15 PROCEDURE — 85025 COMPLETE CBC W/AUTO DIFF WBC: CPT | Performed by: EMERGENCY MEDICINE

## 2023-12-15 PROCEDURE — 96360 HYDRATION IV INFUSION INIT: CPT

## 2023-12-15 PROCEDURE — 36415 COLL VENOUS BLD VENIPUNCTURE: CPT | Performed by: EMERGENCY MEDICINE

## 2023-12-15 RX ADMIN — SODIUM CHLORIDE 1000 ML: 0.9 INJECTION, SOLUTION INTRAVENOUS at 12:38

## 2023-12-15 NOTE — DISCHARGE INSTRUCTIONS
You came in for blurry vision, ambulatory dysfunction, and were given IV fluids. Symptoms resolved and you are comfortable with discharge after being able to walk around the emergency department without assistance. If symptoms return I recommend you return to the emergency department for further evaluation.

## 2023-12-15 NOTE — ED PROVIDER NOTES
History  Chief Complaint   Patient presents with    Blurred Vision     Patient reports blurred vision for the past month on and off and reports today it has gotten worse. Patient also reports feeling off balance and that has been going on for years but today he needs to use a cane. 68-year-old male with history of Parkinson's, history of CAD, hypertension, hyperlipidemia presents with complaint of intermittent blurred vision over the last several weeks, more persistent today. He also reports acute on chronic ambulatory dysfunction which she says he feels like he is going to fall over. Eunice Curd it has been getting worse over the last month however today he had to use a cane which she has used in the past however not normally. Says symptoms are worse when standing, better when staying still. He does report decreased water intake recently. He denies any fevers chills chest pain or shortness of breath. He denies any room spinning sensation. Patient says he has seen ENT in the past for his dizziness and states that it is not from his inner ears. Prior to Admission Medications   Prescriptions Last Dose Informant Patient Reported? Taking?    amLODIPine (NORVASC) 5 mg tablet   Yes No   Sig: Take 5 mg by mouth 2 (two) times a day   aspirin (ECOTRIN LOW STRENGTH) 81 mg EC tablet   Yes No   Sig: Daily   benazepril (LOTENSIN) 20 mg tablet   Yes No   Sig: Take 20 mg by mouth daily   escitalopram (LEXAPRO) 20 mg tablet   Yes No   Sig: Take 20 mg by mouth Daily   predniSONE 10 mg tablet   No No   Sig: Take 6 pills day one, 5 pills day 2, 4 pills day 3, 3 pills day 4, 2 pills day 5, and 1 pill day 6   rosuvastatin (CRESTOR) 20 MG tablet   No No   Sig: Take 1 tablet (20 mg total) by mouth daily   tamsulosin (FLOMAX) 0.4 mg   Yes No   Sig: Take 0.4 mg by mouth daily     valACYclovir (VALTREX) 1,000 mg tablet   No No   Sig: Take 1 tablet (1,000 mg total) by mouth 3 (three) times a day for 10 days Facility-Administered Medications: None       Past Medical History:   Diagnosis Date    Coronary artery disease     s/p stenting    History of nuclear stress test 10/15/2013    Lexiscan - EF 49%, no evidence for prior MI or ischemia. History of nuclear stress test 09/19/2017    Lexiscan - EF 51%, no evidence for prior MI or ischemia. Hyperlipidemia     Hypertension     Parkinson disease        Past Surgical History:   Procedure Laterality Date    CORONARY ANGIOPLASTY WITH STENT PLACEMENT  09/13/2012    EF 65%, stent x2 to 95% mid CX and 80% distal CX.  75% RCA and 60% LAD. TONSILLECTOMY         Family History   Problem Relation Age of Onset    Heart attack Father      I have reviewed and agree with the history as documented. E-Cigarette/Vaping    E-Cigarette Use Never User      E-Cigarette/Vaping Substances    Nicotine No     THC No     CBD No     Flavoring No     Other No     Unknown No      Social History     Tobacco Use    Smoking status: Never    Smokeless tobacco: Never   Vaping Use    Vaping status: Never Used   Substance Use Topics    Alcohol use: No    Drug use: Never       Review of Systems    Physical Exam  Physical Exam  Vitals and nursing note reviewed. Constitutional:       Appearance: Normal appearance. He is well-developed. HENT:      Head: Normocephalic and atraumatic. Mouth/Throat:      Mouth: Mucous membranes are dry. Eyes:      Conjunctiva/sclera: Conjunctivae normal.      Pupils: Pupils are equal, round, and reactive to light. Neck:      Trachea: No tracheal deviation. Cardiovascular:      Rate and Rhythm: Normal rate and regular rhythm. Heart sounds: Normal heart sounds. No murmur heard. Pulmonary:      Effort: Pulmonary effort is normal. No respiratory distress. Breath sounds: Normal breath sounds. No wheezing or rales. Abdominal:      General: Bowel sounds are normal. There is no distension. Palpations: Abdomen is soft. Tenderness:  There is no abdominal tenderness. Musculoskeletal:         General: No deformity. Cervical back: Normal range of motion and neck supple. Skin:     General: Skin is warm and dry. Capillary Refill: Capillary refill takes less than 2 seconds. Neurological:      General: No focal deficit present. Mental Status: He is alert and oriented to person, place, and time. Sensory: No sensory deficit. Motor: Tremor present.       Coordination: Finger-Nose-Finger Test and Heel to [employer] normal.      Comments: Finger-to-nose and heel-to-shin equal bilaterally, some interference related to his tremor however overall normal   Psychiatric:         Mood and Affect: Mood normal.         Judgment: Judgment normal.         Vital Signs  ED Triage Vitals [12/15/23 1224]   Temperature Pulse Respirations Blood Pressure SpO2   97.6 °F (36.4 °C) 67 18 (!) 183/84 98 %      Temp Source Heart Rate Source Patient Position - Orthostatic VS BP Location FiO2 (%)   Temporal Monitor Lying Right arm --      Pain Score       No Pain           Vitals:    12/15/23 1224 12/15/23 1230 12/15/23 1330 12/15/23 1430   BP: (!) 183/84 (!) 183/84 145/70 165/73   Pulse: 67 74 64 64   Patient Position - Orthostatic VS: Lying            Visual Acuity  Visual Acuity      Flowsheet Row Most Recent Value   L Pupil Size (mm) 3   R Pupil Size (mm) 3            ED Medications  Medications   sodium chloride 0.9 % bolus 1,000 mL (0 mL Intravenous Stopped 12/15/23 1410)       Diagnostic Studies  Results Reviewed       Procedure Component Value Units Date/Time    UA w Reflex to Microscopic w Reflex to Culture [[id number]]  (Abnormal) Collected: 12/15/23 1346    Lab Status: Final result Specimen: Urine, Clean Catch Updated: 12/15/23 1355     Color, UA Yellow     Clarity, UA Clear     Specific Gravity, UA <=1.005     pH, UA 6.5     Leukocytes, UA Negative     Nitrite, UA Negative     Protein, UA Negative mg/dl      Glucose, UA Negative mg/dl      Ketones, UA 15 (1+) mg/dl      Urobilinogen, UA 0.2 E.U./dl      Bilirubin, UA Negative     Occult Blood, UA Negative    Basic metabolic panel [[id number]] Collected: 12/15/23 1229    Lab Status: Final result Specimen: Blood from Arm, Right Updated: 12/15/23 1258     Sodium 138 mmol/L      Potassium 3.9 mmol/L      Chloride 101 mmol/L      CO2 25 mmol/L      ANION GAP 12 mmol/L      BUN 13 mg/dL      Creatinine 1.04 mg/dL      Glucose 117 mg/dL      Calcium 9.6 mg/dL      eGFR 69 ml/min/1.73sq m     Narrative:      [facility] ([facility]D):     Stage 1 with normal or high GFR (GFR > 90 mL/min/1.73 square meters)    Stage 2 Mild CKD (GFR = 60-89 mL/min/1.73 square meters)    Stage 3A Moderate CKD (GFR = 45-59 mL/min/1.73 square meters)    Stage 3B Moderate CKD (GFR = 30-44 mL/min/1.73 square meters)    Stage 4 Severe CKD (GFR = 15-29 mL/min/1.73 square meters)    Stage 5 End Stage CKD (GFR <15 mL/min/1.73 square meters)  Note: GFR calculation is accurate only with a steady state creatinine    Magnesium [[id number]]  (Normal) Collected: 12/15/23 1229    Lab Status: Final result Specimen: Blood from Arm, Right Updated: 12/15/23 1258     Magnesium 2.2 mg/dL     CBC and differential [[id number]] Collected: 12/15/23 1229    Lab Status: Final result Specimen: Blood from Arm, Right Updated: 12/15/23 1236     WBC 6.80 Thousand/uL      RBC 4.54 Million/uL      Hemoglobin 14.3 g/dL      Hematocrit 41.9 %      MCV 92 fL      MCH 31.5 pg      MCHC 34.1 g/dL      RDW 11.8 %      MPV 9.7 fL      Platelets 790 Thousands/uL      nRBC 0 /100 WBCs      Neutrophils Relative 74 %      Immat GRANS % 0 %      Lymphocytes Relative 16 %      Monocytes Relative 10 %      Eosinophils Relative 0 %      Basophils Relative 0 %      Neutrophils Absolute 4.95 Thousands/µL      Immature Grans Absolute 0.02 Thousand/uL      Lymphocytes Absolute 1.11 Thousands/µL      Monocytes Absolute 0.69 Thousand/µL Eosinophils Absolute 0.01 Thousand/µL      Basophils Absolute 0.02 Thousands/µL                    No orders to display              Procedures  Procedures         ED Course  ED Course as of 12/15/23 1627   Fri Dec 15, 2023   1321 Patient feels better after IV fluids, still approximately 150-200 mL    1322 left in his fluid bolus. 1359 Patient feels better, ambulating without cane, symptoms likely due to dehydration. Since he is back at his baseline this essentially rules out stroke or other serious disease as the only intervention was IV fluid which resolved his constant dizziness. 1448 Patient is now asymptomatic, comfortable with discharge. Offered further workup however he declines. Will follow-up with his PCP. SBIRT 20yo+      Flowsheet Row Most Recent Value   Initial Alcohol Screen: US AUDIT-C     1. How often do you have a drink containing alcohol? 0 Filed at: 12/15/2023 1231   2. How many drinks containing alcohol do you have on a typical day you are drinking? 0 Filed at: 12/15/2023 1231   3a. Male UNDER 65: How often do you have five or more drinks on one occasion? 0 Filed at: 12/15/2023 1231   3b. FEMALE Any Age, or MALE 65+: How often do you have 4 or more drinks on one occassion? 0 Filed at: 12/15/2023 1231   Audit-C Score 0 Filed at: 12/15/2023 1231   WINTER: How many times in the past year have you. .. Used an illegal drug or used a prescription medication for non-medical reasons? Never Filed at: 12/15/2023 1231                      Medical Decision Making  70-year-old male presents with complaint of worsening blurred vision and balance issues which she has a history of. His exam overall is reassuring with dry mucous membranes however he has no focal neurological deficits, or evidence of vertebral insufficiency. His exam is not consistent with vertigo. He has no chest pain or shortness of breath or symptoms to suggest ACS.     Labs ordered to rule out electrolyte abnormalities. Patient was given IV fluids and reassessed frequently. After 1 L of normal saline his symptoms resolved completely and he was able to ambulate without a cane and had normal vision. I suspect the mild dehydration was the cause of his symptoms. I did offer further workup including CT of the head, however patient declined and felt better and was comfortable with discharge. Recommend primary physician follow-up. Problems Addressed:  Ambulatory dysfunction: acute illness or injury  Dehydration: acute illness or injury  Dizziness: acute illness or injury    Amount and/or Complexity of Data Reviewed  Labs: ordered. Decision-making details documented in ED Course. Risk  Decision regarding hospitalization. Disposition  Final diagnoses:   Dehydration   Dizziness   Ambulatory dysfunction     Time reflects when diagnosis was documented in both MDM as applicable and the Disposition within this note       Time User Action Codes Description Comment    12/15/2023  2:49 PM Ronnie Hires Add [H53.8] Blurry vision, bilateral     12/15/2023  2:49 PM Ronnie Hires Remove [H53.8] Blurry vision, bilateral     12/15/2023  2:49 PM Ronnie Hires Add [E86.0] Dehydration     12/15/2023  2:49 PM Ronnie Hires Add [R42] Dizziness     12/15/2023  2:49 PM Ronnie Hires Add [R26.2] Ambulatory dysfunction           ED Disposition       ED Disposition   Discharge    Condition   Stable    Date/Time   Fri Dec 15, 2023 1941 Nimo Cabral discharge to home/self care. Follow-up Information       Follow up With Specialties Details Why Contact Info    Claudine Doan MD Internal Medicine Schedule an appointment as soon as possible for a visit   [address] [place] [address]  [phone]              Discharge Medication List as of 12/15/2023  2:50 PM        CONTINUE these medications which have NOT CHANGED    Details   amLODIPine (NORVASC) 5 mg tablet Take 5 mg by mouth 2 (two) times a day, Starting Sun 11/25/2018, Historical Med      aspirin (ECOTRIN LOW STRENGTH) 81 mg EC tablet Daily, Historical Med      benazepril (LOTENSIN) 20 mg tablet Take 20 mg by mouth daily, Starting Thu 10/11/2018, Historical Med      escitalopram (LEXAPRO) 20 mg tablet Take 20 mg by mouth Daily, Historical Med      predniSONE 10 mg tablet Take 6 pills day one, 5 pills day 2, 4 pills day 3, 3 pills day 4, 2 pills day 5, and 1 pill day 6, Normal      rosuvastatin (CRESTOR) 20 MG tablet Take 1 tablet (20 mg total) by mouth daily, Starting Tue 1/7/2020, Normal      tamsulosin (FLOMAX) 0.4 mg Take 0.4 mg by mouth daily  , Historical Med      valACYclovir (VALTREX) 1,000 mg tablet Take 1 tablet (1,000 mg total) by mouth 3 (three) times a day for 10 days, Starting Tue 3/30/2021, Until Fri 4/9/2021, Normal             No discharge procedures on file.     PDMP Review       None            ED Provider  Electronically Signed by             Burgess Fahad DO  12/15/23 1577

## 2023-12-16 LAB
ATRIAL RATE: 77 BPM
PR INTERVAL: 138 MS
QRS AXIS: 187 DEGREES
QRSD INTERVAL: 86 MS
QT INTERVAL: 384 MS
QTC INTERVAL: 434 MS
T WAVE AXIS: 130 DEGREES
VENTRICULAR RATE: 77 BPM